# Patient Record
Sex: FEMALE | Race: BLACK OR AFRICAN AMERICAN | Employment: UNEMPLOYED | URBAN - METROPOLITAN AREA
[De-identification: names, ages, dates, MRNs, and addresses within clinical notes are randomized per-mention and may not be internally consistent; named-entity substitution may affect disease eponyms.]

---

## 2017-06-19 ENCOUNTER — HOSPITAL ENCOUNTER (OUTPATIENT)
Dept: ULTRASOUND IMAGING | Age: 57
Discharge: HOME | End: 2017-06-19
Attending: FAMILY MEDICINE
Payer: COMMERCIAL

## 2017-06-19 DIAGNOSIS — R79.89 ELEVATED LFTS: ICD-10-CM

## 2017-06-19 PROCEDURE — 76705 ECHO EXAM OF ABDOMEN: CPT

## 2017-10-10 ENCOUNTER — HOSPITAL ENCOUNTER (OUTPATIENT)
Dept: MAMMOGRAPHY | Age: 57
Discharge: HOME | End: 2017-10-10
Attending: OBSTETRICS & GYNECOLOGY
Payer: COMMERCIAL

## 2017-10-10 DIAGNOSIS — Z12.39 SCREENING BREAST EXAMINATION: ICD-10-CM

## 2017-10-10 PROCEDURE — 77067 SCR MAMMO BI INCL CAD: CPT

## 2017-10-16 ENCOUNTER — DOCUMENTATION ONLY (OUTPATIENT)
Dept: SURGERY | Age: 57
End: 2017-10-16

## 2017-12-04 ENCOUNTER — HOSPITAL ENCOUNTER (OUTPATIENT)
Dept: MAMMOGRAPHY | Age: 57
Discharge: HOME | End: 2017-12-04
Payer: COMMERCIAL

## 2017-12-04 ENCOUNTER — HOSPITAL ENCOUNTER (OUTPATIENT)
Dept: ULTRASOUND IMAGING | Age: 57
Discharge: HOME | End: 2017-12-04
Payer: COMMERCIAL

## 2017-12-04 DIAGNOSIS — N63.0 BREAST NODULE: ICD-10-CM

## 2017-12-04 PROCEDURE — 77061 BREAST TOMOSYNTHESIS UNI: CPT

## 2018-10-06 ENCOUNTER — HOSPITAL ENCOUNTER (OUTPATIENT)
Dept: GENERAL RADIOLOGY | Age: 58
Discharge: HOME | End: 2018-10-06
Payer: COMMERCIAL

## 2018-10-06 DIAGNOSIS — M25.562 LEFT KNEE PAIN: ICD-10-CM

## 2018-10-06 PROCEDURE — 73564 X-RAY EXAM KNEE 4 OR MORE: CPT

## 2018-10-23 ENCOUNTER — OFFICE VISIT (OUTPATIENT)
Dept: ORTHOPEDIC SURGERY | Age: 58
End: 2018-10-23

## 2018-10-23 VITALS
TEMPERATURE: 97.9 F | BODY MASS INDEX: 36.06 KG/M2 | OXYGEN SATURATION: 100 % | WEIGHT: 191 LBS | HEART RATE: 80 BPM | HEIGHT: 61 IN | RESPIRATION RATE: 16 BRPM | DIASTOLIC BLOOD PRESSURE: 80 MMHG | SYSTOLIC BLOOD PRESSURE: 121 MMHG

## 2018-10-23 DIAGNOSIS — S83.242A ACUTE MEDIAL MENISCAL TEAR, LEFT, INITIAL ENCOUNTER: Primary | ICD-10-CM

## 2018-10-23 PROBLEM — E66.01 SEVERE OBESITY (HCC): Status: ACTIVE | Noted: 2018-10-23

## 2018-10-23 RX ORDER — OMEPRAZOLE 20 MG/1
20 CAPSULE, DELAYED RELEASE ORAL DAILY
COMMUNITY

## 2018-10-23 RX ORDER — LISINOPRIL AND HYDROCHLOROTHIAZIDE 20; 25 MG/1; MG/1
TABLET ORAL
COMMUNITY

## 2018-10-23 RX ORDER — ATORVASTATIN CALCIUM 80 MG/1
80 TABLET, FILM COATED ORAL
COMMUNITY

## 2018-10-23 RX ORDER — METFORMIN HYDROCHLORIDE 500 MG/1
TABLET ORAL 2 TIMES DAILY WITH MEALS
COMMUNITY

## 2018-10-23 RX ORDER — MELOXICAM 15 MG/1
15 TABLET ORAL
Qty: 30 TAB | Refills: 1 | Status: SHIPPED | OUTPATIENT
Start: 2018-10-23 | End: 2018-12-11 | Stop reason: SDUPTHER

## 2018-10-23 NOTE — PROGRESS NOTES
Davin Gamez  1960   Chief Complaint   Patient presents with    Knee Pain     left knee pain        HISTORY OF PRESENT ILLNESS  Davin Gamez is a 63 y.o. female who presents today for evaluation of left knee pain. she rates her pain 3/10 today. Pain has been present since August 2018 when the patient was playing volleyball and felt a pop. The patient has been limping. Patient describes the pain as aching and sharp that is Intermittent in nature. Symptoms are worse with prolonged walking and standing, stairs, Activity and is better with  Rest, Elevation. Associated symptoms include Swelling. Since problem started, it: is unchanged. Pain does not wake patient up at night. Has taken no meds for the problem. Has tried following treatments: Injections:NO; Brace:NO; Therapy:NO; Cane/Crutch:NO       Allergies   Allergen Reactions    Codeine Nausea and Vomiting        History reviewed. No pertinent past medical history. Social History     Socioeconomic History    Marital status:      Spouse name: Not on file    Number of children: Not on file    Years of education: Not on file    Highest education level: Not on file   Social Needs    Financial resource strain: Not on file    Food insecurity - worry: Not on file    Food insecurity - inability: Not on file    Transportation needs - medical: Not on file   [place] needs - non-medical: Not on file   Occupational History    Not on file   Tobacco Use    Smoking status: Former Smoker    Smokeless tobacco: Never Used   Substance and Sexual Activity    Alcohol use: Not on file    Drug use: Not on file    Sexual activity: Not on file   Other Topics Concern    Not on file   Social History Narrative    Not on file      History reviewed. No pertinent surgical history.    Family History   Problem Relation Age of Onset    Breast Cancer Sister 46      Current Outpatient Medications   Medication Sig    omeprazole (PRILOSEC) 20 mg capsule Take 20 mg by mouth daily.  lisinopril-hydroCHLOROthiazide (PRINZIDE, ZESTORETIC) 20-25 mg per tablet Take  by mouth daily.  atorvastatin (LIPITOR) 80 mg tablet Take 80 mg by mouth daily.  metFORMIN (GLUCOPHAGE) 500 mg tablet Take  by mouth two (2) times daily (with meals). No current facility-administered medications for this visit. REVIEW OF SYSTEM   Patient denies: Weight loss, Fever/Chills, HA, Visual changes, Fatigue, Chest pain, SOB, Abdominal pain, N/V/D/C, Blood in stool or urine, Edema. Pertinent positive as above in HPI. All others were negative    PHYSICAL EXAM:   Visit Vitals  /80   Pulse 80   Temp 97.9 °F (36.6 °C) (Oral)   Resp 16   Ht 5' 1\" (1.549 m)   Wt 191 lb (86.6 kg)   SpO2 100%   BMI 36.09 kg/m²     The patient is a well-developed, well-nourished female   in no acute distress. The patient is alert and oriented times three. The patient is alert and oriented times three. Mood and affect are normal.  LYMPHATIC: lymph nodes are not enlarged and are within normal limits  SKIN: normal in color and non tender to palpation. There are no bruises or abrasions noted. NEUROLOGICAL: Motor sensory exam is within normal limits. Reflexes are equal bilaterally. There is normal sensation to pinprick and light touch  MUSCULOSKELETAL:  Examination Left knee   Skin Intact   Range of motion 0-130   Effusion +   Medial joint line tenderness +   Lateral joint line tenderness +   Tenderness Pes Bursa -   Tenderness insertion MCL -   Tenderness insertion LCL -   Carmellas -   Patella crepitus -   Patella grind -   Lachman -   Pivot shift -   Anterior drawer -   Posterior drawer -   Varus stress -   Valgus stress -   Neurovascular Intact   Calf Swelling and Tenderness to Palpation -   Diane's Test -   Hamstring Cord Tightness -     IMAGING: XR of left knee dated 10/6/18 was reviewed and read: Impression:   No evidence of acute fracture or dislocation.    Mild tricompartmental degenerative changes, best seen medially. IMPRESSION:      ICD-10-CM ICD-9-CM    1. Acute medial meniscal tear, left, initial encounter S83.242A 836.0 MRI KNEE LT WO CONT      meloxicam (MOBIC) 15 mg tablet        PLAN:  1. The patient presents today with left knee due to a possible medial meniscal tear and I would like the patient to get an MRI. Risk factors include: n/a  2. No ultrasound exam indicated today  3. No cortisone injection indicated today   4. No Physical/Occupational Therapy indicated today  5. Yes diagnostic test indicated today: MRI L KNEE  6. No durable medical equipment indicated today  7. No referral indicated today   8. Yes medications indicated today: MOBIC  9. No Narcotic indicated today       RTC following MRI  Follow-up Disposition: Not on File    Scribed by Moses Sherwood [address]) as dictated by Kan Mercedes MD    I, Dr. Kan Mercedes, confirm that all documentation is accurate.     Kan Mercedes M.D.   [facility]

## 2018-10-31 ENCOUNTER — HOSPITAL ENCOUNTER (OUTPATIENT)
Dept: MRI IMAGING | Age: 58
Discharge: HOME | End: 2018-10-31
Attending: ORTHOPAEDIC SURGERY
Payer: COMMERCIAL

## 2018-10-31 DIAGNOSIS — S83.242A ACUTE MEDIAL MENISCAL TEAR, LEFT, INITIAL ENCOUNTER: ICD-10-CM

## 2018-10-31 PROCEDURE — 73721 MRI JNT OF LWR EXTRE W/O DYE: CPT

## 2018-11-06 ENCOUNTER — OFFICE VISIT (OUTPATIENT)
Dept: ORTHOPEDIC SURGERY | Age: 58
End: 2018-11-06

## 2018-11-06 VITALS
WEIGHT: 194 LBS | OXYGEN SATURATION: 99 % | BODY MASS INDEX: 36.63 KG/M2 | DIASTOLIC BLOOD PRESSURE: 81 MMHG | HEIGHT: 61 IN | TEMPERATURE: 98.1 F | HEART RATE: 77 BPM | RESPIRATION RATE: 16 BRPM | SYSTOLIC BLOOD PRESSURE: 142 MMHG

## 2018-11-06 DIAGNOSIS — S83.242D ACUTE MEDIAL MENISCAL TEAR, LEFT, SUBSEQUENT ENCOUNTER: Primary | ICD-10-CM

## 2018-11-06 DIAGNOSIS — M17.12 PRIMARY OSTEOARTHRITIS OF LEFT KNEE: ICD-10-CM

## 2018-11-06 NOTE — PROGRESS NOTES
Ana Lilia Sosa  1960   Chief Complaint   Patient presents with    Knee Pain     left knee MRI results        HISTORY OF PRESENT ILLNESS  Ana Lilia Sosa is a 63 y.o. female who presents today for reevaluation of left knee pain and to review MRI. Patient rates pain as 2/10 today. Pain has been present since August 2018 when the patient was playing volleyball and felt a pop. The patient has been limping. Pain with prolonged walking and standing, stairs. Patient denies any fever, chills, chest pain, shortness of breath or calf pain. The remainder of the review of systems is negative. There are no new illness or injuries to report since last seen in the office. There are no changes to medications, allergies, family or social history. PHYSICAL EXAM:   Visit Vitals  /81   Pulse 77   Temp 98.1 °F (36.7 °C) (Oral)   Resp 16   Ht 5' 1\" (1.549 m)   Wt 194 lb (88 kg)   SpO2 99%   BMI 36.66 kg/m²     The patient is a well-developed, well-nourished female   in no acute distress. The patient is alert and oriented times three. The patient is alert and oriented times three. Mood and affect are normal.  LYMPHATIC: lymph nodes are not enlarged and are within normal limits  SKIN: normal in color and non tender to palpation. There are no bruises or abrasions noted. NEUROLOGICAL: Motor sensory exam is within normal limits. Reflexes are equal bilaterally.  There is normal sensation to pinprick and light touch  MUSCULOSKELETAL:  Examination Left knee   Skin Intact   Range of motion 0-130   Effusion +   Medial joint line tenderness +   Lateral joint line tenderness +   Tenderness Pes Bursa -   Tenderness insertion MCL -   Tenderness insertion LCL -   Carmellas -   Patella crepitus -   Patella grind -   Lachman -   Pivot shift -   Anterior drawer -   Posterior drawer -   Varus stress -   Valgus stress -   Neurovascular Intact   Calf Swelling and Tenderness to Palpation -   Diane's Test -   Hamstring Cord Tightness - IMAGING: MRI of left knee dated 10/31/18 was reviewed and read: IMPRESSION:  Tricompartment osteoarthritis with degenerative macerated tearing of the posterior horn medial meniscus as discussed. Moderate knee effusion. Moderate Baker's cyst with chronic calcification. Anterior cruciate ligament poorly visualized. Tear not excluded. Correlate with anterior drawer test.     XR of left knee dated 10/6/18 was reviewed and read: Impression:   No evidence of acute fracture or dislocation. Mild tricompartmental degenerative changes, best seen medially.      IMPRESSION:      ICD-10-CM ICD-9-CM    1. Acute medial meniscal tear, left, subsequent encounter S83.242D V58.89      836.0    2. Primary osteoarthritis of left knee M17.12 715.16         PLAN:   1. I discussed the risks and benefits and potential adverse outcomes of both operative vs non operative treatment of left medial meniscus tear with the patient. Patient wishes to proceed with arthroscopic left medial meniscectomy. Risks of operative intervention include but not limited to bleeding, infection, deep vein thrombosis, pulmonary embolism, death, limb length discrepancy, reflexive sympathetic dystrophy, fat embolism syndrome,damage to blood vessels and nerves, malunion, non-union, delayed union, failure of hardware, post traumatic arthritis, stroke, heart attack, and death. Patient understands that infection may arise and may require numerous surgeries. History and physical exam scheduled for a later date. Risk factors include: n/a  2. No ultrasound exam indicated today  3. No cortisone injection indicated today   4. No Physical/Occupational Therapy indicated today  5. No diagnostic test indicated today:   6. No durable medical equipment indicated today  7. No referral indicated today   8. No medications indicated today:   9.  No Narcotic indicated today       RTC H&P  Follow-up Disposition: Not on File    Scribed by Moses Sherwood ([facility]) as dictated by MD QUINCY Joe, Dr. Afshin Davis, confirm that all documentation is accurate.     Afshin Davis M.D.   [facility]

## 2018-11-29 DIAGNOSIS — Z01.818 PREOP EXAMINATION: Primary | ICD-10-CM

## 2018-12-03 ENCOUNTER — HOSPITAL ENCOUNTER (OUTPATIENT)
Dept: LAB | Age: 58
Discharge: HOME | End: 2018-12-03
Payer: COMMERCIAL

## 2018-12-03 DIAGNOSIS — Z01.818 PREOP EXAMINATION: ICD-10-CM

## 2018-12-03 LAB
ANION GAP SERPL CALC-SCNC: 5 MMOL/L (ref 3–18)
ATRIAL RATE: 74 BPM
BASOPHILS # BLD: 0 K/UL (ref 0–0.1)
BASOPHILS NFR BLD: 0 % (ref 0–2)
BUN SERPL-MCNC: 12 MG/DL (ref 7–18)
BUN/CREAT SERPL: 15 (ref 12–20)
CALCIUM SERPL-MCNC: 9.7 MG/DL (ref 8.5–10.1)
CALCULATED P AXIS, ECG09: 31 DEGREES
CALCULATED R AXIS, ECG10: 47 DEGREES
CALCULATED T AXIS, ECG11: 19 DEGREES
CHLORIDE SERPL-SCNC: 106 MMOL/L (ref 100–108)
CO2 SERPL-SCNC: 30 MMOL/L (ref 21–32)
CREAT SERPL-MCNC: 0.82 MG/DL (ref 0.6–1.3)
DIAGNOSIS, 93000: NORMAL
DIFFERENTIAL METHOD BLD: NORMAL
EOSINOPHIL # BLD: 0.2 K/UL (ref 0–0.4)
EOSINOPHIL NFR BLD: 3 % (ref 0–5)
ERYTHROCYTE [DISTWIDTH] IN BLOOD BY AUTOMATED COUNT: 13.1 % (ref 11.6–14.5)
GLUCOSE SERPL-MCNC: 89 MG/DL (ref 74–99)
HCT VFR BLD AUTO: 38.9 % (ref 35–45)
HGB BLD-MCNC: 13.3 G/DL (ref 12–16)
LYMPHOCYTES # BLD: 2.1 K/UL (ref 0.9–3.6)
LYMPHOCYTES NFR BLD: 42 % (ref 21–52)
MCH RBC QN AUTO: 29.8 PG (ref 24–34)
MCHC RBC AUTO-ENTMCNC: 34.2 G/DL (ref 31–37)
MCV RBC AUTO: 87.2 FL (ref 74–97)
MONOCYTES # BLD: 0.4 K/UL (ref 0.05–1.2)
MONOCYTES NFR BLD: 8 % (ref 3–10)
NEUTS SEG # BLD: 2.3 K/UL (ref 1.8–8)
NEUTS SEG NFR BLD: 47 % (ref 40–73)
P-R INTERVAL, ECG05: 166 MS
PLATELET # BLD AUTO: 263 K/UL (ref 135–420)
PMV BLD AUTO: 10.5 FL (ref 9.2–11.8)
POTASSIUM SERPL-SCNC: 4.5 MMOL/L (ref 3.5–5.5)
Q-T INTERVAL, ECG07: 382 MS
QRS DURATION, ECG06: 60 MS
QTC CALCULATION (BEZET), ECG08: 424 MS
RBC # BLD AUTO: 4.46 M/UL (ref 4.2–5.3)
SODIUM SERPL-SCNC: 141 MMOL/L (ref 136–145)
VENTRICULAR RATE, ECG03: 74 BPM
WBC # BLD AUTO: 4.9 K/UL (ref 4.6–13.2)

## 2018-12-03 PROCEDURE — 80048 BASIC METABOLIC PNL TOTAL CA: CPT

## 2018-12-03 PROCEDURE — 85025 COMPLETE CBC W/AUTO DIFF WBC: CPT

## 2018-12-03 PROCEDURE — 36415 COLL VENOUS BLD VENIPUNCTURE: CPT

## 2018-12-03 PROCEDURE — 93005 ELECTROCARDIOGRAM TRACING: CPT

## 2018-12-11 ENCOUNTER — OFFICE VISIT (OUTPATIENT)
Dept: ORTHOPEDIC SURGERY | Age: 58
End: 2018-12-11

## 2018-12-11 VITALS
DIASTOLIC BLOOD PRESSURE: 77 MMHG | HEART RATE: 82 BPM | TEMPERATURE: 97.5 F | WEIGHT: 192 LBS | SYSTOLIC BLOOD PRESSURE: 132 MMHG | OXYGEN SATURATION: 100 % | HEIGHT: 61 IN | BODY MASS INDEX: 36.25 KG/M2

## 2018-12-11 DIAGNOSIS — S83.242A ACUTE MEDIAL MENISCAL TEAR, LEFT, INITIAL ENCOUNTER: ICD-10-CM

## 2018-12-11 DIAGNOSIS — M17.12 PRIMARY OSTEOARTHRITIS OF LEFT KNEE: ICD-10-CM

## 2018-12-11 DIAGNOSIS — S83.242D ACUTE MEDIAL MENISCAL TEAR, LEFT, SUBSEQUENT ENCOUNTER: Primary | ICD-10-CM

## 2018-12-11 RX ORDER — HYDROCODONE BITARTRATE AND ACETAMINOPHEN 10; 325 MG/1; MG/1
1 TABLET ORAL
Qty: 40 TAB | Refills: 0 | Status: SHIPPED | OUTPATIENT
Start: 2018-12-11

## 2018-12-11 RX ORDER — ACETAMINOPHEN 325 MG/1
1000 TABLET ORAL ONCE
Status: CANCELLED | OUTPATIENT
Start: 2018-12-11 | End: 2018-12-11

## 2018-12-11 NOTE — H&P
HISTORY AND PHYSICAL          Patient: Ana Lilia Sosa                MRN: [id number]       SSN: [id number]  YOB: 1960          AGE: 63 y.o. SEX: female      Patient scheduled for:  Left knee arthroscopic partial medial menisectomy    Surgeon: Ruth Rodriguez MD    ANESTHESIA TYPE:  General    HISTORY:     The patient was seen in the office today for a preoperative history and physical for an upcoming above listed surgery. The patient is a pleasant 63 y.o. female who has a history of left knee pain. Pain has been present since August 2018 when the patient was playing volleyball and felt a pop. The patient has been limping. Pain with prolonged walking and standing, stairs. Pain level is a 2/10. Due to the current findings, affected activity of daily living and continued pain and discomfort, surgical intervention is indicated. The alternatives, risks, and complications, including but not limited to infection, blood loss, need for blood transfusion, neurovascular damage, jensen-incisional numbness, subcutaneous hematoma, bone fracture, anesthetic complications, DVT, PE, death, RSD, postoperative stiffness and pain, possible surgical scar, delayed healing and nonhealing, reflexive sympathetic dystrophy, damage to blood vessels and nerves, need for more surgery, MI, and stroke,  failure of hardware, gait disturbances,have been discussed. The patient understands and wishes to proceed with surgery. PAST MEDICAL HISTORY:     Past Medical History:   Diagnosis Date    Elevated cholesterol     GERD (gastroesophageal reflux disease)     Hypertension     Pre-diabetes        CURRENT MEDICATIONS:     Current Outpatient Medications   Medication Sig Dispense Refill    HYDROcodone-acetaminophen (NORCO)  mg tablet Take 1 Tab by mouth every four (4) hours as needed for Pain. Max Daily Amount: 6 Tabs.  Do not take until after surgery (for acute post operative pain) 40 Tab 0    omeprazole (PRILOSEC) 20 mg capsule Take 20 mg by mouth daily.  lisinopril-hydroCHLOROthiazide (PRINZIDE, ZESTORETIC) 20-25 mg per tablet Take  by mouth nightly.  atorvastatin (LIPITOR) 80 mg tablet Take 80 mg by mouth nightly.  metFORMIN (GLUCOPHAGE) 500 mg tablet Take  by mouth two (2) times daily (with meals).  meloxicam (MOBIC) 15 mg tablet Take 1 Tab by mouth daily (with breakfast). 30 Tab 1       ALLERGIES:     Allergies   Allergen Reactions    Codeine Nausea and Vomiting         SURGICAL HISTORY:     Past Surgical History:   Procedure Laterality Date    HX CYST REMOVAL      bartholin cyst x 2    HX CYST REMOVAL Left     x2    HX PARTIAL HYSTERECTOMY      HX TONSILLECTOMY         SOCIAL HISTORY:     Social History     Socioeconomic History    Marital status: UNKNOWN     Spouse name: Not on file    Number of children: Not on file    Years of education: Not on file    Highest education level: Not on file   Tobacco Use    Smoking status: Former Smoker    Smokeless tobacco: Never Used   Substance and Sexual Activity    Alcohol use: Yes     Alcohol/week: 0.6 oz     Types: 1 Glasses of wine per week     Frequency: Never     Comment: occasional    Drug use: No       FAMILY HISTORY:     Family History   Problem Relation Age of Onset    Breast Cancer Sister 46       REVIEW OF SYSTEMS:     Negative for fevers, chills, chest pain, shortness of breath, weight loss, recent illness     General: Negative for fever and chills. No unexpected change in weight. Denies fatigue. No change in appetite. Skin: Negative for rash or itching. HEENT: Negative for congestion, sore throat, neck pain and neck stiffness. No change in vision or hearing. Hasn't noted any enlarged lymph nodes in the neck. Cardiovascular:  Negative for chest pain and palpitations. Has not noted pedal edema. Respiratory: Negative for cough, colds, sinus, hemoptysis, shortness of breath and wheezing.   Gastrointestinal: Negative for nausea and vomiting, rectal bleeding, coffee ground emesis, abdominal pain, diarrhea and constipation. Genitourinary: Negative for dysuria, frequency urgency, or burning on micturition. No flank pain, no foul smelling urine, no difficulty with initiating urination. Hematological: Negative for bleeding or easy bruising. Musculoskeletal: Negative  for arthralgias, back pain or neck pain. Neurological: Negative for dizziness, seizures or syncopal episodes. Denies headaches. Endocrine: Denies excessive thirst.  No heat/cold intolerance. Psychiatric: Negative for depression or insomnia. PHYSICAL EXAMINATION:     VITALS: There were no vitals taken for this visit. GEN:  Well developed, well nourished 63 y.o. female in no acute distress. HEENT: Normocephalic and atraumatic. Eyes: Conjunctivae and EOM are normal.Pupils are equal, round, and reactive to light. External ear normal appearance, external nose normal appearing. Mouth/Throat: Oropharynx is clear and moist, able to handle oral secretions w/out difficulty, airway patent  NECK: Supple. Normal ROM, No lymphadenopathy. Trachea is midline. No bruising, swelling or deformity  RESP: Clear to auscultation bilaterally. No wheezes, rales, rhonchi. Normal effort and breath sounds. No respiratory distress  CARDIO:  Normal rate, regular rhythm and normal heart sounds. No MGR. ABDOMEN: Soft, non-tender, non-distended, normoactive bowel sounds in all four quadrants. There is no tenderness. There is no rebound and no guarding.    BACK: No CVA or spinal tenderness  BREAST:  Deferred  PELVIC:    Deferred   RECTAL:  Deferred   :           Deferred  EXTREMITIES: EXAMINATION OF: left knee  Examination Left knee   Skin Intact   Range of motion 0-120   Effusion +   Medial joint line tenderness +   Lateral joint line tenderness -   Tenderness Pes Bursa -   Tenderness insertion MCL -   Tenderness insertion LCL -   Carmellas -   Patella crepitus -   Patella grind -   Lachman -   Pivot shift -   Anterior drawer -   Posterior drawer -   Varus stress -   Valgus stress -   Neurovascular Intact   Calf Swelling and Tenderness to Palpation -   Diane's Test -   Hamstring Cord Tightness -       NEUROVASCULAR: Sensation intact to light touch and strength grossly intact and symmetrical. No nystagmus. Positive distal pulses and capillary refill. DVT ASSESSMENT:  There is not  calf tenderness. No evidence of DVT seen on physical exam.  MOTOR: In tact  PSYCH: Alert an oriented to person, place and time. Mood, memory, affect, behavior and judgment normal       RADIOGRAPHS & DIAGNOSTIC STUDIES:     MRI/xray reveals :  MRI of left knee dated 10/31/18 was reviewed and read: IMPRESSION:  Tricompartment osteoarthritis with degenerative macerated tearing of the posterior horn medial meniscus as discussed. Moderate knee effusion. Moderate Baker's cyst with chronic calcification. Anterior cruciate ligament poorly visualized. Tear not excluded. Correlate with anterior drawer test.         LABS:       @  CBC:   Lab Results   Component Value Date/Time    WBC 4.9 12/03/2018 01:17 PM    RBC 4.46 12/03/2018 01:17 PM    HGB 13.3 12/03/2018 01:17 PM    HCT 38.9 12/03/2018 01:17 PM    PLATELET 636 34/36/9207 01:17 PM    and BMP:   Lab Results   Component Value Date/Time    Glucose 89 12/03/2018 01:17 PM    Sodium 141 12/03/2018 01:17 PM    Potassium 4.5 12/03/2018 01:17 PM    Chloride 106 12/03/2018 01:17 PM    CO2 30 12/03/2018 01:17 PM    BUN 12 12/03/2018 01:17 PM    Creatinine 0.82 12/03/2018 01:17 PM    Calcium 9.7 12/03/2018 01:17 PM   @    Preoperative labs were reviewed and are substantially within normal limits   EKG: Sinus rhythm with marked sinus arrhythmia   Otherwise normal ECG   No previous ECGs available   Confirmed by Reza Sun MD, --- ([id number]) on 12/3/2018 2:57:46 PM       ASSESSMENT:       Encounter Diagnoses   Name Primary?     Acute medial meniscal tear, left, subsequent encounter Yes    Primary osteoarthritis of left knee        PLAN:     Again, the alternatives, risks, and complications, as well as expected outcome were discussed. The patient understands and agrees to proceed with left knee arthroscopic partial medial menisectomy.  Patient given orders listed below:    Orders Placed This Encounter    HYDROcodone-acetaminophen (NORCO)  mg tablet         Domonique Conteh PA-C  12/11/2018  10:39 AM

## 2018-12-11 NOTE — PATIENT INSTRUCTIONS
Dr. Cesario Yeager Knee Arthroscopy Surgery    What is the surgery? - This is an outpatient procedure at either [place] [address] or [address] [place], [place] [address]dodie Burgess will be completely asleep for procedure. Dr. Cesario Yeager will make 2 small incisions in your knee. He will take a tour of your knee with the camera and then address the meniscal tear(s). We will be able to evaluate if any arthritis in your knee but this surgery is not to treat your arthritis. - Total surgery takes about 25-30 mins     What can you expect after surgery? - You will have a bulky dressing on your knee that you can remove 2 days after surgery. You will be able to shower 2 days after surgery but no soaking in a bath, hot tub, ocean or pool x 2 weeks to allow for full wound healing. No special brace is needed. - You will be on crutches or a walker when you leave the hospital. You can place weight on your leg as tolerated starting immediately. You are usually on crutches or your walker for about 4-5 days.   - Even though you can place weight on your leg, we recommend no walking or standing longer than 10mins for the first week. We will gradually increase your activities after that point.    - Dr. Cesario Yeager will start physical therapy for you when you return for your 1 week post op apt  - It will take your 6-8 weeks to fully recover from your surgery. When can I return to work? - Most patients return to desk work only after 1-2 weeks. We recommend no prolonged walking or standing, climbing, kneeling, or crawling x 6-8 weeks. Not all knee arthroscopies are the same. The specifics of your individual case will be discussed at length with you by Dr. Cesario Yeager and his Physician Assistant.

## 2018-12-12 RX ORDER — MELOXICAM 15 MG/1
TABLET ORAL
Qty: 30 TAB | Refills: 1 | Status: SHIPPED | OUTPATIENT
Start: 2018-12-12

## 2018-12-13 ENCOUNTER — ANESTHESIA EVENT (OUTPATIENT)
Dept: SURGERY | Age: 58
End: 2018-12-13
Payer: COMMERCIAL

## 2018-12-13 RX ORDER — SODIUM CHLORIDE 0.9 % (FLUSH) 0.9 %
5-10 SYRINGE (ML) INJECTION EVERY 8 HOURS
Status: CANCELLED | OUTPATIENT
Start: 2018-12-13

## 2018-12-13 RX ORDER — SODIUM CHLORIDE 0.9 % (FLUSH) 0.9 %
5-10 SYRINGE (ML) INJECTION AS NEEDED
Status: CANCELLED | OUTPATIENT
Start: 2018-12-13

## 2018-12-14 ENCOUNTER — HOSPITAL ENCOUNTER (OUTPATIENT)
Age: 58
Setting detail: OUTPATIENT SURGERY
Discharge: HOME | End: 2018-12-14
Attending: ORTHOPAEDIC SURGERY | Admitting: ORTHOPAEDIC SURGERY
Payer: COMMERCIAL

## 2018-12-14 ENCOUNTER — ANESTHESIA (OUTPATIENT)
Dept: SURGERY | Age: 58
End: 2018-12-14
Payer: COMMERCIAL

## 2018-12-14 VITALS
BODY MASS INDEX: 35.87 KG/M2 | HEIGHT: 61 IN | WEIGHT: 190 LBS | OXYGEN SATURATION: 93 % | RESPIRATION RATE: 15 BRPM | HEART RATE: 73 BPM | DIASTOLIC BLOOD PRESSURE: 70 MMHG | TEMPERATURE: 96.6 F | SYSTOLIC BLOOD PRESSURE: 105 MMHG

## 2018-12-14 LAB
GLUCOSE BLD STRIP.AUTO-MCNC: 112 MG/DL (ref 70–110)
GLUCOSE BLD STRIP.AUTO-MCNC: 114 MG/DL (ref 70–110)

## 2018-12-14 PROCEDURE — 76210000006 HC OR PH I REC 0.5 TO 1 HR: Performed by: ORTHOPAEDIC SURGERY

## 2018-12-14 PROCEDURE — 74011250636 HC RX REV CODE- 250/636: Performed by: PHYSICIAN ASSISTANT

## 2018-12-14 PROCEDURE — 74011250636 HC RX REV CODE- 250/636

## 2018-12-14 PROCEDURE — 82962 GLUCOSE BLOOD TEST: CPT

## 2018-12-14 PROCEDURE — 76060000032 HC ANESTHESIA 0.5 TO 1 HR: Performed by: ORTHOPAEDIC SURGERY

## 2018-12-14 PROCEDURE — 76010000138 HC OR TIME 0.5 TO 1 HR: Performed by: ORTHOPAEDIC SURGERY

## 2018-12-14 PROCEDURE — 77030020782 HC GWN BAIR PAWS FLX 3M -B: Performed by: ORTHOPAEDIC SURGERY

## 2018-12-14 PROCEDURE — 77030008574 HC TBNG SUC IRR STRY -B: Performed by: ORTHOPAEDIC SURGERY

## 2018-12-14 PROCEDURE — 74011250636 HC RX REV CODE- 250/636: Performed by: NURSE ANESTHETIST, CERTIFIED REGISTERED

## 2018-12-14 PROCEDURE — 77030013079 HC BLNKT BAIR HGGR 3M -A: Performed by: ANESTHESIOLOGY

## 2018-12-14 PROCEDURE — 74011000250 HC RX REV CODE- 250: Performed by: ORTHOPAEDIC SURGERY

## 2018-12-14 PROCEDURE — 77030018836 HC SOL IRR NACL ICUM -A: Performed by: ORTHOPAEDIC SURGERY

## 2018-12-14 PROCEDURE — 77030032490 HC SLV COMPR SCD KNE COVD -B: Performed by: ORTHOPAEDIC SURGERY

## 2018-12-14 PROCEDURE — 74011250637 HC RX REV CODE- 250/637: Performed by: NURSE ANESTHETIST, CERTIFIED REGISTERED

## 2018-12-14 PROCEDURE — 74011000250 HC RX REV CODE- 250

## 2018-12-14 PROCEDURE — 74011250637 HC RX REV CODE- 250/637: Performed by: PHYSICIAN ASSISTANT

## 2018-12-14 PROCEDURE — 77030039266 HC ADH SKN EXOFIN S2SG -A: Performed by: ORTHOPAEDIC SURGERY

## 2018-12-14 PROCEDURE — 77030008683 HC TU ET CUF COVD -A: Performed by: ANESTHESIOLOGY

## 2018-12-14 PROCEDURE — 77030002933 HC SUT MCRYL J&J -A: Performed by: ORTHOPAEDIC SURGERY

## 2018-12-14 PROCEDURE — 76210000026 HC REC RM PH II 1 TO 1.5 HR: Performed by: ORTHOPAEDIC SURGERY

## 2018-12-14 RX ORDER — FENTANYL CITRATE 50 UG/ML
INJECTION, SOLUTION INTRAMUSCULAR; INTRAVENOUS AS NEEDED
Status: DISCONTINUED | OUTPATIENT
Start: 2018-12-14 | End: 2018-12-14 | Stop reason: HOSPADM

## 2018-12-14 RX ORDER — ONDANSETRON 2 MG/ML
INJECTION INTRAMUSCULAR; INTRAVENOUS AS NEEDED
Status: DISCONTINUED | OUTPATIENT
Start: 2018-12-14 | End: 2018-12-14 | Stop reason: HOSPADM

## 2018-12-14 RX ORDER — MIDAZOLAM HYDROCHLORIDE 1 MG/ML
INJECTION, SOLUTION INTRAMUSCULAR; INTRAVENOUS AS NEEDED
Status: DISCONTINUED | OUTPATIENT
Start: 2018-12-14 | End: 2018-12-14 | Stop reason: HOSPADM

## 2018-12-14 RX ORDER — SODIUM CHLORIDE, SODIUM LACTATE, POTASSIUM CHLORIDE, CALCIUM CHLORIDE 600; 310; 30; 20 MG/100ML; MG/100ML; MG/100ML; MG/100ML
50 INJECTION, SOLUTION INTRAVENOUS CONTINUOUS
Status: DISCONTINUED | OUTPATIENT
Start: 2018-12-14 | End: 2018-12-14 | Stop reason: HOSPADM

## 2018-12-14 RX ORDER — ALBUTEROL SULFATE 0.83 MG/ML
2.5 SOLUTION RESPIRATORY (INHALATION) AS NEEDED
Status: DISCONTINUED | OUTPATIENT
Start: 2018-12-14 | End: 2018-12-14 | Stop reason: HOSPADM

## 2018-12-14 RX ORDER — GLYCOPYRROLATE 0.2 MG/ML
INJECTION INTRAMUSCULAR; INTRAVENOUS AS NEEDED
Status: DISCONTINUED | OUTPATIENT
Start: 2018-12-14 | End: 2018-12-14 | Stop reason: HOSPADM

## 2018-12-14 RX ORDER — BUPIVACAINE HYDROCHLORIDE AND EPINEPHRINE 5; 5 MG/ML; UG/ML
INJECTION, SOLUTION EPIDURAL; INTRACAUDAL; PERINEURAL AS NEEDED
Status: DISCONTINUED | OUTPATIENT
Start: 2018-12-14 | End: 2018-12-14 | Stop reason: HOSPADM

## 2018-12-14 RX ORDER — INSULIN LISPRO 100 [IU]/ML
INJECTION, SOLUTION INTRAVENOUS; SUBCUTANEOUS ONCE
Status: DISCONTINUED | OUTPATIENT
Start: 2018-12-14 | End: 2018-12-14 | Stop reason: HOSPADM

## 2018-12-14 RX ORDER — LIDOCAINE HYDROCHLORIDE 10 MG/ML
0.1 INJECTION, SOLUTION EPIDURAL; INFILTRATION; INTRACAUDAL; PERINEURAL AS NEEDED
Status: DISCONTINUED | OUTPATIENT
Start: 2018-12-14 | End: 2018-12-14 | Stop reason: HOSPADM

## 2018-12-14 RX ORDER — ROCURONIUM BROMIDE 10 MG/ML
INJECTION, SOLUTION INTRAVENOUS AS NEEDED
Status: DISCONTINUED | OUTPATIENT
Start: 2018-12-14 | End: 2018-12-14 | Stop reason: HOSPADM

## 2018-12-14 RX ORDER — MAGNESIUM SULFATE 100 %
4 CRYSTALS MISCELLANEOUS AS NEEDED
Status: DISCONTINUED | OUTPATIENT
Start: 2018-12-14 | End: 2018-12-14 | Stop reason: SDUPTHER

## 2018-12-14 RX ORDER — PHENYLEPHRINE HCL IN 0.9% NACL 1 MG/10 ML
SYRINGE (ML) INTRAVENOUS AS NEEDED
Status: DISCONTINUED | OUTPATIENT
Start: 2018-12-14 | End: 2018-12-14 | Stop reason: HOSPADM

## 2018-12-14 RX ORDER — MAGNESIUM SULFATE 100 %
4 CRYSTALS MISCELLANEOUS AS NEEDED
Status: DISCONTINUED | OUTPATIENT
Start: 2018-12-14 | End: 2018-12-14 | Stop reason: HOSPADM

## 2018-12-14 RX ORDER — DIPHENHYDRAMINE HYDROCHLORIDE 50 MG/ML
12.5 INJECTION, SOLUTION INTRAMUSCULAR; INTRAVENOUS
Status: DISCONTINUED | OUTPATIENT
Start: 2018-12-14 | End: 2018-12-14 | Stop reason: HOSPADM

## 2018-12-14 RX ORDER — PROPOFOL 10 MG/ML
INJECTION, EMULSION INTRAVENOUS AS NEEDED
Status: DISCONTINUED | OUTPATIENT
Start: 2018-12-14 | End: 2018-12-14 | Stop reason: HOSPADM

## 2018-12-14 RX ORDER — FAMOTIDINE 20 MG/1
20 TABLET, FILM COATED ORAL ONCE
Status: COMPLETED | OUTPATIENT
Start: 2018-12-14 | End: 2018-12-14

## 2018-12-14 RX ORDER — DEXTROSE 50 % IN WATER (D50W) INTRAVENOUS SYRINGE
25-50 AS NEEDED
Status: DISCONTINUED | OUTPATIENT
Start: 2018-12-14 | End: 2018-12-14 | Stop reason: SDUPTHER

## 2018-12-14 RX ORDER — SUCCINYLCHOLINE CHLORIDE 20 MG/ML
INJECTION INTRAMUSCULAR; INTRAVENOUS AS NEEDED
Status: DISCONTINUED | OUTPATIENT
Start: 2018-12-14 | End: 2018-12-14 | Stop reason: HOSPADM

## 2018-12-14 RX ORDER — HYDROMORPHONE HYDROCHLORIDE 2 MG/ML
0.5 INJECTION, SOLUTION INTRAMUSCULAR; INTRAVENOUS; SUBCUTANEOUS
Status: DISCONTINUED | OUTPATIENT
Start: 2018-12-14 | End: 2018-12-14 | Stop reason: HOSPADM

## 2018-12-14 RX ORDER — CEFAZOLIN SODIUM 2 G/50ML
2 SOLUTION INTRAVENOUS ONCE
Status: COMPLETED | OUTPATIENT
Start: 2018-12-14 | End: 2018-12-14

## 2018-12-14 RX ORDER — SODIUM CHLORIDE, SODIUM LACTATE, POTASSIUM CHLORIDE, CALCIUM CHLORIDE 600; 310; 30; 20 MG/100ML; MG/100ML; MG/100ML; MG/100ML
75 INJECTION, SOLUTION INTRAVENOUS CONTINUOUS
Status: DISCONTINUED | OUTPATIENT
Start: 2018-12-14 | End: 2018-12-14 | Stop reason: HOSPADM

## 2018-12-14 RX ORDER — ONDANSETRON 2 MG/ML
4 INJECTION INTRAMUSCULAR; INTRAVENOUS ONCE
Status: COMPLETED | OUTPATIENT
Start: 2018-12-14 | End: 2018-12-14

## 2018-12-14 RX ORDER — DEXTROSE 50 % IN WATER (D50W) INTRAVENOUS SYRINGE
25-50 AS NEEDED
Status: DISCONTINUED | OUTPATIENT
Start: 2018-12-14 | End: 2018-12-14 | Stop reason: HOSPADM

## 2018-12-14 RX ORDER — METOPROLOL TARTRATE 5 MG/5ML
INJECTION INTRAVENOUS AS NEEDED
Status: DISCONTINUED | OUTPATIENT
Start: 2018-12-14 | End: 2018-12-14 | Stop reason: HOSPADM

## 2018-12-14 RX ORDER — LIDOCAINE HYDROCHLORIDE 20 MG/ML
INJECTION, SOLUTION EPIDURAL; INFILTRATION; INTRACAUDAL; PERINEURAL AS NEEDED
Status: DISCONTINUED | OUTPATIENT
Start: 2018-12-14 | End: 2018-12-14 | Stop reason: HOSPADM

## 2018-12-14 RX ORDER — KETOROLAC TROMETHAMINE 30 MG/ML
INJECTION, SOLUTION INTRAMUSCULAR; INTRAVENOUS AS NEEDED
Status: DISCONTINUED | OUTPATIENT
Start: 2018-12-14 | End: 2018-12-14 | Stop reason: HOSPADM

## 2018-12-14 RX ORDER — ACETAMINOPHEN 500 MG
1000 TABLET ORAL ONCE
Status: COMPLETED | OUTPATIENT
Start: 2018-12-14 | End: 2018-12-14

## 2018-12-14 RX ORDER — NALOXONE HYDROCHLORIDE 0.4 MG/ML
0.04 INJECTION, SOLUTION INTRAMUSCULAR; INTRAVENOUS; SUBCUTANEOUS AS NEEDED
Status: DISCONTINUED | OUTPATIENT
Start: 2018-12-14 | End: 2018-12-14 | Stop reason: HOSPADM

## 2018-12-14 RX ORDER — DEXAMETHASONE SODIUM PHOSPHATE 4 MG/ML
INJECTION, SOLUTION INTRA-ARTICULAR; INTRALESIONAL; INTRAMUSCULAR; INTRAVENOUS; SOFT TISSUE AS NEEDED
Status: DISCONTINUED | OUTPATIENT
Start: 2018-12-14 | End: 2018-12-14 | Stop reason: HOSPADM

## 2018-12-14 RX ORDER — HYDROMORPHONE HYDROCHLORIDE 2 MG/ML
INJECTION, SOLUTION INTRAMUSCULAR; INTRAVENOUS; SUBCUTANEOUS AS NEEDED
Status: DISCONTINUED | OUTPATIENT
Start: 2018-12-14 | End: 2018-12-14 | Stop reason: HOSPADM

## 2018-12-14 RX ADMIN — FENTANYL CITRATE 50 MCG: 50 INJECTION, SOLUTION INTRAMUSCULAR; INTRAVENOUS at 13:46

## 2018-12-14 RX ADMIN — ONDANSETRON 4 MG: 2 INJECTION INTRAMUSCULAR; INTRAVENOUS at 14:53

## 2018-12-14 RX ADMIN — GLYCOPYRROLATE 0.2 MG: 0.2 INJECTION INTRAMUSCULAR; INTRAVENOUS at 12:50

## 2018-12-14 RX ADMIN — SODIUM CHLORIDE, SODIUM LACTATE, POTASSIUM CHLORIDE, AND CALCIUM CHLORIDE: 600; 310; 30; 20 INJECTION, SOLUTION INTRAVENOUS at 13:30

## 2018-12-14 RX ADMIN — KETOROLAC TROMETHAMINE 30 MG: 30 INJECTION, SOLUTION INTRAMUSCULAR; INTRAVENOUS at 13:46

## 2018-12-14 RX ADMIN — CEFAZOLIN SODIUM 2 G: 2 SOLUTION INTRAVENOUS at 12:48

## 2018-12-14 RX ADMIN — ACETAMINOPHEN 1000 MG: 500 TABLET ORAL at 11:30

## 2018-12-14 RX ADMIN — Medication 100 MCG: at 13:11

## 2018-12-14 RX ADMIN — LIDOCAINE HYDROCHLORIDE 60 MG: 20 INJECTION, SOLUTION EPIDURAL; INFILTRATION; INTRACAUDAL; PERINEURAL at 12:56

## 2018-12-14 RX ADMIN — FENTANYL CITRATE 100 MCG: 50 INJECTION, SOLUTION INTRAMUSCULAR; INTRAVENOUS at 13:31

## 2018-12-14 RX ADMIN — SUCCINYLCHOLINE CHLORIDE 100 MG: 20 INJECTION INTRAMUSCULAR; INTRAVENOUS at 12:56

## 2018-12-14 RX ADMIN — MIDAZOLAM HYDROCHLORIDE 2 MG: 1 INJECTION, SOLUTION INTRAMUSCULAR; INTRAVENOUS at 12:50

## 2018-12-14 RX ADMIN — ROCURONIUM BROMIDE 5 MG: 10 INJECTION, SOLUTION INTRAVENOUS at 12:56

## 2018-12-14 RX ADMIN — PROPOFOL 50 MG: 10 INJECTION, EMULSION INTRAVENOUS at 13:25

## 2018-12-14 RX ADMIN — FENTANYL CITRATE 100 MCG: 50 INJECTION, SOLUTION INTRAMUSCULAR; INTRAVENOUS at 12:56

## 2018-12-14 RX ADMIN — DEXAMETHASONE SODIUM PHOSPHATE 4 MG: 4 INJECTION, SOLUTION INTRA-ARTICULAR; INTRALESIONAL; INTRAMUSCULAR; INTRAVENOUS; SOFT TISSUE at 13:11

## 2018-12-14 RX ADMIN — SODIUM CHLORIDE, SODIUM LACTATE, POTASSIUM CHLORIDE, AND CALCIUM CHLORIDE 75 ML/HR: 600; 310; 30; 20 INJECTION, SOLUTION INTRAVENOUS at 11:30

## 2018-12-14 RX ADMIN — ONDANSETRON 4 MG: 2 INJECTION INTRAMUSCULAR; INTRAVENOUS at 12:50

## 2018-12-14 RX ADMIN — PROPOFOL 150 MG: 10 INJECTION, EMULSION INTRAVENOUS at 12:56

## 2018-12-14 RX ADMIN — HYDROMORPHONE HYDROCHLORIDE 0.5 MG: 2 INJECTION, SOLUTION INTRAMUSCULAR; INTRAVENOUS; SUBCUTANEOUS at 13:51

## 2018-12-14 RX ADMIN — FAMOTIDINE 20 MG: 20 TABLET ORAL at 11:30

## 2018-12-14 RX ADMIN — METOPROLOL TARTRATE 2 MG: 5 INJECTION INTRAVENOUS at 13:45

## 2018-12-14 NOTE — PROGRESS NOTES
Date of Surgery Update:  Isreal Mares was seen and examined. History and physical has been reviewed. The patient has been examined.  There have been no significant clinical changes since the completion of the originally dated History and Physical.    Signed By: Nasrin Alberto MD     December 14, 2018 12:39 PM

## 2018-12-14 NOTE — ANESTHESIA PREPROCEDURE EVALUATION
Anesthetic History   No history of anesthetic complications            Review of Systems / Medical History  Patient summary reviewed and pertinent labs reviewed    Pulmonary  Within defined limits                 Neuro/Psych   Within defined limits           Cardiovascular    Hypertension: well controlled                   GI/Hepatic/Renal     GERD: well controlled           Endo/Other        Morbid obesity     Other Findings              Physical Exam    Airway  Mallampati: II  TM Distance: 4 - 6 cm  Neck ROM: normal range of motion   Mouth opening: Normal     Cardiovascular               Dental  No notable dental hx       Pulmonary                 Abdominal  GI exam deferred       Other Findings            Anesthetic Plan    ASA: 3  Anesthesia type: general          Induction: Intravenous  Anesthetic plan and risks discussed with: Patient

## 2018-12-14 NOTE — ANESTHESIA POSTPROCEDURE EVALUATION
Procedure(s):  LEFT KNEE ARTHROSCOPIC PARTIAL MEDIAL MENISECTOMY.     Anesthesia Post Evaluation      Multimodal analgesia: multimodal analgesia used between 6 hours prior to anesthesia start to PACU discharge  Patient location during evaluation: bedside  Patient participation: complete - patient participated  Level of consciousness: awake  Pain management: adequate  Airway patency: patent  Anesthetic complications: no  Cardiovascular status: stable  Respiratory status: acceptable  Hydration status: acceptable  Post anesthesia nausea and vomiting:  controlled      Visit Vitals  /70 (BP Patient Position: At rest)   Pulse 73   Temp 35.9 °C (96.6 °F)   Resp 15   Ht 5' 1\" (1.549 m)   Wt 86.2 kg (190 lb)   SpO2 93%   BMI 35.90 kg/m²

## 2018-12-14 NOTE — BRIEF OP NOTE
BRIEF OPERATIVE NOTE    Date of Procedure: 12/14/2018   Preoperative Diagnosis: S83.242D ACUTE MEDIAL MENISCUS TEAR, LEFT, SUBSEQUENT ENCOUNTER  Postoperative Diagnosis: S83.242D ACUTE MEDIAL and lateeral MENISCUS TEAR, LEFT, SUBSEQUENT ENCOUNTER    Procedure(s):  LEFT KNEE ARTHROSCOPIC PARTIAL MEDIAL,lateral MENISECTOMY  Surgeon(s) and Role:     Alondra Gray MD - Primary         Surgical Assistant: Amita Watson    Surgical Staff:  Circ-1: Luis Alfredo Stevens RN  Scrub Tech-1: Edward Hernandez  Surg Asst-1: Eleuterio Mason  Event Time In Time Out   Incision Start 1321    Incision Close       Anesthesia: General   Estimated Blood Loss: minimal  Specimens: * No specimens in log *   Findings: as above   Complications: none  Implants: * No implants in log *

## 2018-12-14 NOTE — DISCHARGE INSTRUCTIONS
Dr. Carmen Cramer Knee Arthroscopy  Postoperative Information    You will be given a prescription for pain medication. It may be taken every 4-6 hours as needed for the first 4-5 days. You may elevate your leg and place an ice pack on top of the dressing in  order to prevent swelling. A soft bandage was placed on your knee to soak up blood and fluid. You may take the bandage off the day after surgery, carefully cleaning the incision sites with peroxide. Band-Aids should be used for the first 4-5 days over each incision. There are 2 small incisions in your knee that may be sore and develop bruising over the next several days. This should resolve over the next few weeks. No special care will be needed. You should expect swelling in the area. You may elevate your leg and apply an icepack on top of the dressing to help minimize the swelling. Deep massage to the lower leg may also be utilized. It is safe to take a shower two days after surgery. You may begin bearing weight on your leg with the use of crutches for the first 4-5 days. Apply as much weight as tolerated so that at the end of 4-5 days, you can walk without crutches. Avoid prolonged walking or standing during the first few weeks after surgery. During your first week please work on gentle range of motion of your knee. Even though your incisions are small, there has been an operation inside and around the knee joint. Complete healing may take several months. If you have a high temperature, unexpected pain, redness or swelling, or any drainage around your knee area, please call my office immediately. Please make an appointment to return to my office in one week.     Dr. Carmen Cramer office number [phone]    DISCHARGE SUMMARY from Nurse    PATIENT INSTRUCTIONS:    After general anesthesia or intravenous sedation, for 24 hours or while taking prescription Narcotics:  · Limit your activities  · Do not drive and operate hazardous machinery  · Do not make important personal or business decisions  · Do  not drink alcoholic beverages  · If you have not urinated within 8 hours after discharge, please contact your surgeon on call. Report the following to your surgeon:  · Excessive pain, swelling, redness or odor of or around the surgical area  · Temperature over 100.5  · Nausea and vomiting lasting longer than 4 hours or if unable to take medications  · Any signs of decreased circulation or nerve impairment to extremity: change in color, persistent  numbness, tingling, coldness or increase pain  · Any questions    *  Please give a list of your current medications to your Primary Care Provider. *  Please update this list whenever your medications are discontinued, doses are      changed, or new medications (including over-the-counter products) are added. *  Please carry medication information at all times in case of emergency situations. These are general instructions for a healthy lifestyle:    No smoking/ No tobacco products/ Avoid exposure to second hand smoke  Surgeon General's Warning:  Quitting smoking now greatly reduces serious risk to your health. Obesity, smoking, and sedentary lifestyle greatly increases your risk for illness    A healthy diet, regular physical exercise & weight monitoring are important for maintaining a healthy lifestyle    You may be retaining fluid if you have a history of heart failure or if you experience any of the following symptoms:  Weight gain of 3 pounds or more overnight or 5 pounds in a week, increased swelling in our hands or feet or shortness of breath while lying flat in bed. Please call your doctor as soon as you notice any of these symptoms; do not wait until your next office visit.     Recognize signs and symptoms of STROKE:    F-face looks uneven    A-arms unable to move or move unevenly    S-speech slurred or non-existent    T-time-call 911 as soon as signs and symptoms begin-DO NOT go       Back to bed or wait to see if you get better-TIME IS BRAIN. Warning Signs of HEART ATTACK     Call 911 if you have these symptoms:   Chest discomfort. Most heart attacks involve discomfort in the center of the chest that lasts more than a few minutes, or that goes away and comes back. It can feel like uncomfortable pressure, squeezing, fullness, or pain.  Discomfort in other areas of the upper body. Symptoms can include pain or discomfort in one or both arms, the back, neck, jaw, or stomach.  Shortness of breath with or without chest discomfort.  Other signs may include breaking out in a cold sweat, nausea, or lightheadedness. Don't wait more than five minutes to call 911 - MINUTES MATTER! Fast action can save your life. Calling 911 is almost always the fastest way to get lifesaving treatment. Emergency Medical Services staff can begin treatment when they arrive -- up to an hour sooner than if someone gets to the hospital by car. The discharge information has been reviewed with the patient. The patient verbalized understanding. Discharge medications reviewed with the patient and appropriate educational materials and side effects teaching were provided.   ___________________________________________________________________________________________________________________________________

## 2018-12-15 NOTE — OP NOTES
[facility]  OPERATIVE REPORT    Vergil Kehr  MR#: [id number]  : 1960  ACCOUNT #: [de-identified]   DATE OF SERVICE: 2018    PREOPERATIVE DIAGNOSIS:  Medial and lateral meniscus tear, left knee. POSTOPERATIVE DIAGNOSIS:  Medial and lateral meniscus tear, left knee. PROCEDURE:  Arthroscopic partial medial and lateral meniscectomy, left knee. SURGEON:  Coco Infante MD    ASSISTANT:  Cata Gerber    ESTIMATED BLOOD LOSS:  Minimal.    COMPLICATIONS:  None. SPECIMENS REMOVED:  None. IMPLANTS:  None. FINDINGS:  As above. ANESTHESIA:  General.    BRIEF HISTORY:  The patient has had significant amount of problems with the left knee not responding to conservative treatment and was consented for surgery after having discussion at length the possible risks and complications of surgery including infection, bleeding, recurrence of pain and all other possible problems. PROCEDURE IN DETAIL:  The patient was taken to the operating room, induced under general endotracheal by anesthesia staff, placed in the center arthroscopy raygoza. The left leg was prepped with ChloraPrep solution, draped as a free sterile field. An anterolateral portal was used as an arthroscopy portal and inferomedial portal as a work portal.  Portals were made with 11 blade followed by blunt trocars. Once the arthroscope was placed in the knee was systematically evaluated and inside the suprapatellar pouch, patellofemoral joint, diffuse degenerative changes are noted, and on the medial compartment diffuse degenerative changes are noted as well, grade III, almost grade IV changes and a radial tear of the posterior medial attachment which was debrided using a straight basket forceps, a 3.5 shaver, leaving a stable rim. Lateral compartment, there was a tear in the posterolateral attachment that was debrided using straight basket forceps and 3.5 shaver, leaving a stable rim.   Diffuse degenerative changes noted as well, and the notch was very narrow, given the spurs in the notch. Fluid was suctioned out and the knee was injected with a mixture of Marcaine. Portals were closed with 4-0 Monocryl. Sterile dressings were applied. Patient tolerated the procedure well and was taken to recovery room without problems.       MD KARLEY Lewis / SHILPA  D: 12/14/2018 15:48     T: 12/14/2018 22:57  JOB #: [id number]

## 2018-12-17 ENCOUNTER — TELEPHONE (OUTPATIENT)
Dept: ORTHOPEDIC SURGERY | Age: 58
End: 2018-12-17

## 2018-12-17 NOTE — TELEPHONE ENCOUNTER
Patient called stating she was told to take her bandage off to clean her wound today, but she is wondering if she has to take off her clear bandage that is protecting her incision. Please advise patient back regarding this at [phone].

## 2018-12-17 NOTE — TELEPHONE ENCOUNTER
Spoke with patient, notified her per KRIS Frank to leave the clear bandage on and that we will remove it at her post-op appointment. Patient verbalized understanding.

## 2018-12-21 ENCOUNTER — OFFICE VISIT (OUTPATIENT)
Dept: ORTHOPEDIC SURGERY | Age: 58
End: 2018-12-21

## 2018-12-21 VITALS
RESPIRATION RATE: 16 BRPM | HEART RATE: 83 BPM | DIASTOLIC BLOOD PRESSURE: 76 MMHG | WEIGHT: 190.2 LBS | OXYGEN SATURATION: 96 % | TEMPERATURE: 95.8 F | BODY MASS INDEX: 35.91 KG/M2 | SYSTOLIC BLOOD PRESSURE: 125 MMHG | HEIGHT: 61 IN

## 2018-12-21 DIAGNOSIS — M17.12 PRIMARY OSTEOARTHRITIS OF LEFT KNEE: ICD-10-CM

## 2018-12-21 DIAGNOSIS — Z98.890 S/P ARTHROSCOPIC PARTIAL MEDIAL MENISCECTOMY: Primary | ICD-10-CM

## 2018-12-21 DIAGNOSIS — S83.207D ACUTE MENISCAL TEAR OF LEFT KNEE, SUBSEQUENT ENCOUNTER: ICD-10-CM

## 2018-12-21 NOTE — PROGRESS NOTES
Patient: Livier Flower  YOB: 1960       HISTORY:  The patient presents for reevaluation of her left knee status post arthroscopic partial medial and lateral menisectomy with degenerative arthritis on 12/14/18. Patient is improved, states pain is a 3 out of 10.  she has not gone to physical therapy. Patient denies any fever, chills, chest pain, shortness of breath or calf pain. The remainder of the review of systems is negative. There are no new illness or injuries to report since last seen in the office. No changes in medications, allergies, social or family history. PHYSICAL EXAMINATION:    Visit Vitals  /76   Pulse 83   Temp 95.8 °F (35.4 °C) (Oral)   Resp 16   Ht 5' 1\" (1.549 m)   Wt 190 lb 3.2 oz (86.3 kg)   SpO2 96%   BMI 35.94 kg/m²     The patient is a well-developed, well-nourished female in no acute distress. The patient is alert and oriented times three. The patient appears to be well groomed. Mood and affect are normal.   ORTHOPEDIC EXAM of Left knee: Inspection: Effusion present,  incisions clean, dry intact, sutures in place  TTP: medial joint line  Range of motion: 0-120 flexion  Stability: Stable  Strength: 5/5  2+ distal pulses    IMPRESSION:  Status post Left knee arthroscopic partial medial and lateral menisectomy with degen arthritis with joint space narrowing. PLAN: Incisions cleaned. Surgery was discussed at length today. Stressed to patient that nothing causes an increase in pain or swelling. Patient is weight bearing as tolerated. OK to d/c use of crutches/walker. Will set up with physical therapy. Patient does not request refill of pain medication. Patient will follow up in 3 weeks.     EVELYN Gray [facility]

## 2018-12-21 NOTE — PROGRESS NOTES
1. Have you been to the ER, urgent care clinic since your last visit? Hospitalized since your last visit? No    2. Have you seen or consulted any other health care providers outside of the [address] [place] [address] since your last visit? Include any pap smears or colon screening.  No

## 2018-12-31 ENCOUNTER — HOSPITAL ENCOUNTER (OUTPATIENT)
Dept: PHYSICAL THERAPY | Age: 58
Discharge: HOME | End: 2018-12-31
Payer: COMMERCIAL

## 2018-12-31 PROCEDURE — 97162 PT EVAL MOD COMPLEX 30 MIN: CPT

## 2018-12-31 PROCEDURE — 97110 THERAPEUTIC EXERCISES: CPT

## 2018-12-31 NOTE — PROGRESS NOTES
PT DAILY TREATMENT NOTE 10-18    Patient Name: Sherri Valencia  Date:2018  : 1960  [x]  Patient  Verified  Payor: Janet Torres / Plan: Real Colla / Product Type: HMO /    In time:2:04  Out time:2:44  Total Treatment Time (min): 44  Visit #: 1 of 8    Medicare/BCBS Only   Total Timed Codes (min):  24 1:1 Treatment Time:  44       Treatment Area: Left knee pain [M25.562]    SUBJECTIVE  Pain Level (0-10 scale): 2-3  Any medication changes, allergies to medications, adverse drug reactions, diagnosis change, or new procedure performed?: [x] No    [] Yes (see summary sheet for update)  Subjective functional status/changes:   [] No changes reported  Patient is a 63 y.o female presenting s/p left knee partial lateral and medial menisectomy on 18. Patient states her pain is intermittent and typically stays around 2-3/10. Patient states she has been able to ambulate without the crutches without increase in pain, however notices she is walking different than before. Patient states she is currently unable to ambulate up/down steps so she has been staying with her mom since her mom doesn't have steps. OBJECTIVE      24 min Therapeutic Exercise:  [x] See flow sheet :   Rationale: increase ROM and increase strength to improve the patients ability to perform ADLs with improved mobility. With   [] TE   [] TA   [] neuro   [] other: Patient Education: [x] Review HEP    [] Progressed/Changed HEP based on:   [] positioning   [] body mechanics   [] transfers   [] heat/ice application    [] other:      Other Objective/Functional Measures: See IE     Pain Level (0-10 scale) post treatment: 2-3    ASSESSMENT/Changes in Function: Patient presents with impairments consistent with decreased ROM, decreased strength, slight antalgic gait with decreased stance time on LLE, increased joint effusion, and decreased muscle flexibility.  Patient educated on importance of rest, ice, compression, and elevation in order to decrease joint swelling. Patient will continue to benefit from skilled PT services to modify and progress therapeutic interventions, address functional mobility deficits, address ROM deficits, address strength deficits, analyze and address soft tissue restrictions, analyze and cue movement patterns, analyze and modify body mechanics/ergonomics and assess and modify postural abnormalities to attain remaining goals. [x]  See Plan of Care  []  See progress note/recertification  []  See Discharge Summary         Progress towards goals / Updated goals:  Short Term Goals: To be accomplished in 2 weeks:               1. Patient will be independent and compliant with HEP in order to improve functional mobility. 2. Patient will be able to decrease left knee pain to no more than 0/10 in order to improve ease of ADLs. Long Term Goals: To be accomplished in 4 weeks:               1. Patient will be able to improve FOTO score to 78 in order to demonstrate improvements in functional independence. 2. Patient will be able to improve left knee AROM to 0-120 in order to improve ease of ADLs. 3. Patient will be able to perform a squat with proper mechanics without difficulty in order to return to PLOF. 4. Patient will be able to go up and down a flight of stairs without difficulty in order to improve functional mobility.      PLAN  []  Upgrade activities as tolerated     [x]  Continue plan of care  []  Update interventions per flow sheet       []  Discharge due to:_  []  Other:_      Balbir Sethi, PT 12/31/2018  2:43 PM    Future Appointments   Date Time Provider Ha Whitten   1/11/2019  1:45 PM KRIS Malik

## 2018-12-31 NOTE — PROGRESS NOTES
[facility]  [address] [facility] [address]  [place], [address]  [phone] fax    Plan of Care/ Statement of Necessity for Physical Therapy Services    Patient name: Chad Olivas Start of Care: 2018   Referral source: Simona Mccoy AlaBanner : 1960    Medical Diagnosis: Left knee pain [M25.562]  Payor: Sanjuanita Vanessa / Plan: Puma Castaneda / Product Type: HMO /  Onset Date:2018 DOS: 18    Treatment Diagnosis: Left knee partial lateral and medial menisectomy    Prior Hospitalization: see medical history Provider#: [id number]   Medications: Verified on Patient summary List    Comorbidities: arthritis, BMI >30, DM, high blood pressure   Prior Level of Function: (I) with all ADLs and recreational activities     The Plan of Care and following information is based on the information from the initial evaluation. Assessment/ key information: Patient is a 63 y.o female presenting s/p left knee partial lateral and medial menisectomy on 18. Patient states her pain is intermittent and typically stays around 2-3/10. Patient states she has been able to ambulate without the crutches without increase in pain, however notices she is walking different than before. Patient states she is currently unable to ambulate up/down steps so she has been staying with her mom since her mom doesn't have steps. Patient presents with impairments consistent with decreased ROM, decreased strength, slight antalgic gait with decreased stance time on LLE, increased joint effusion, and decreased muscle flexibility. Patient educated on importance of rest, ice, compression, and elevation in order to decrease joint swelling. Patient will benefit from skilled PT in order to address these deficits.    Evaluation Complexity History MEDIUM  Complexity : 1-2 comorbidities / personal factors will impact the outcome/ POC ; Examination MEDIUM Complexity : 3 Standardized tests and measures addressing body structure, function, activity limitation and / or participation in recreation  ;Presentation MEDIUM Complexity : Evolving with changing characteristics  ; Clinical Decision Making LOW Complexity : FOTO score of   Overall Complexity Rating: MEDIUM  Problem List: pain affecting function, decrease ROM, decrease strength, edema affecting function, impaired gait/ balance, decrease ADL/ functional abilitiies, decrease activity tolerance and decrease flexibility/ joint mobility   Treatment Plan may include any combination of the following: Therapeutic exercise, Therapeutic activities, Neuromuscular re-education, Physical agent/modality, Gait/balance training, Manual therapy, Patient education, Functional mobility training and Stair training  Patient / Family readiness to learn indicated by: asking questions, trying to perform skills and interest  Persons(s) to be included in education: patient (P)  Barriers to Learning/Limitations: None  Patient Goal (s): To get my mobility back  Patient Self Reported Health Status: good  Rehabilitation Potential: good    Short Term Goals: To be accomplished in 2 weeks:   1. Patient will be independent and compliant with HEP in order to improve functional mobility. 2. Patient will be able to decrease left knee pain to no more than 0/10 in order to improve ease of ADLs. Long Term Goals: To be accomplished in 4 weeks:   1. Patient will be able to improve FOTO score to 78 in order to demonstrate improvements in functional independence. 2. Patient will be able to improve left knee AROM to 0-120 in order to improve ease of ADLs. 3. Patient will be able to perform a squat with proper mechanics without difficulty in order to return to PLOF. 4. Patient will be able to go up and down a flight of stairs without difficulty in order to improve functional mobility. Frequency / Duration: Patient to be seen 2 times per week for 4 weeks.     Patient/ Caregiver education and instruction: Diagnosis, prognosis, self care, activity modification and exercises   [x]  Plan of care has been reviewed with INDIRA Jorgensen, PT 12/31/2018 2:43 PM    ________________________________________________________________________    I certify that the above Therapy Services are being furnished while the patient is under my care. I agree with the treatment plan and certify that this therapy is necessary.     Physician's Signature:____________Date:_________TIME:________    ** Signature, Date and Time must be completed for valid certification **    Please sign and return to [facility] [address] [place] [address] & [facility]  [address]  [place], [facility] [address]  [phone] fax

## 2019-01-02 ENCOUNTER — HOSPITAL ENCOUNTER (OUTPATIENT)
Dept: PHYSICAL THERAPY | Age: 59
Discharge: HOME | End: 2019-01-02
Payer: COMMERCIAL

## 2019-01-02 PROCEDURE — 97110 THERAPEUTIC EXERCISES: CPT

## 2019-01-02 PROCEDURE — 97140 MANUAL THERAPY 1/> REGIONS: CPT

## 2019-01-02 PROCEDURE — 97016 VASOPNEUMATIC DEVICE THERAPY: CPT

## 2019-01-02 NOTE — PROGRESS NOTES
PT DAILY TREATMENT NOTE 10-18    Patient Name: Issac Ibarra  Date:2019  : 1960  [x]  Patient  Verified  Payor: Jesi Melton / Plan: Mariely Carolina / Product Type: HMO /    In time:2:00  Out time:2:45  Total Treatment Time (min): 45  Visit #: 2 of 8    Medicare/BCBS Only   Total Timed Codes (min):  35 1:1 Treatment Time:  30       Treatment Area: Left knee pain [M25.562]    SUBJECTIVE  Pain Level (0-10 scale): 0  Any medication changes, allergies to medications, adverse drug reactions, diagnosis change, or new procedure performed?: [x] No    [] Yes (see summary sheet for update)  Subjective functional status/changes:   [] No changes reported  Patient reports HEP compliance.      OBJECTIVE    27 min Therapeutic Exercise:  [x] See flow sheet :   Rationale: increase ROM, increase strength and improve coordination to improve the patients ability to increase ease with ADLs         8 min Manual Therapy:  PROM to left knee, left patellar mobs   Rationale: decrease pain, increase ROM and increase tissue extensibility to ease ADL tolerance    Modality rationale: decrease pain to improve the patients ability to ease soreness after therapy   Min Type Additional Details    [] Estim:  []Unatt       []IFC  []Premod                        []Other:  []w/ice   []w/heat  Position:  Location:    [] Estim: []Att    []TENS instruct  []NMES                    []Other:  []w/US   []w/ice   []w/heat  Position:  Location:    []  Traction: [] Cervical       []Lumbar                       [] Prone          []Supine                       []Intermittent   []Continuous Lbs:  [] before manual  [] after manual    []  Ultrasound: []Continuous   [] Pulsed                           []1MHz   []3MHz Location:  W/cm2:    []  Iontophoresis with dexamethasone         Location: [] Take home patch   [] In clinic    []  Ice     []  heat  []  Ice massage  []  Laser   []  Anodyne Position:  Location:    []  Laser with stim  []  Other: Position:  Location:   10 [x]  Vasopneumatic Device Pressure:       [x] lo [] med [] hi   Temperature: [x] lo [] med [] hi   [] Skin assessment post-treatment:  []intact []redness- no adverse reaction    []redness - adverse reaction:          With   [] TE   [] TA   [] neuro   [] other: Patient Education: [x] Review HEP    [] Progressed/Changed HEP based on:   [] positioning   [] body mechanics   [] transfers   [] heat/ice application    [] other:      Other Objective/Functional Measures:   First follow up session---cues sequencing and correct form throughout     Pain Level (0-10 scale) post treatment: 0    ASSESSMENT/Changes in Function:   Initiated POC per flowsheet. Patient puts forth good effort with exercises and reports HEP compliance. She requires cues to avoid rushing through reps. Decreased proximal hip stability. She has little to no difficulty with exercises. Encouraged her to avoid overdoing and remain consistent with HEP. Continue to progress activities if pain remains low. Patient will continue to benefit from skilled PT services to modify and progress therapeutic interventions, address functional mobility deficits, address ROM deficits, address strength deficits, analyze and address soft tissue restrictions, analyze and cue movement patterns, analyze and modify body mechanics/ergonomics and assess and modify postural abnormalities to attain remaining goals. []  See Plan of Care  []  See progress note/recertification  []  See Discharge Summary         Progress towards goals / Updated goals:  Short Term Goals: To be accomplished in 2 weeks:               1. Patient will be independent and compliant with HEP in order to improve functional mobility. Met per patient report (1/2/2019)               2. Patient will be able to decrease left knee pain to no more than 0/10 in order to improve ease of ADLs. Long Term Goals: To be accomplished in 4 weeks:               1.  Patient will be able to improve FOTO score to 78 in order to demonstrate improvements in functional independence.                2. Patient will be able to improve left knee AROM to 0-120 in order to improve ease of ADLs.              3. Patient will be able to perform a squat with proper mechanics without difficulty in order to return to PLOF.              4. Patient will be able to go up and down a flight of stairs without difficulty in order to improve functional mobility.          PLAN  []  Upgrade activities as tolerated     [x]  Continue plan of care  []  Update interventions per flow sheet       []  Discharge due to:_  []  Other:_      Colby Polanco 1/2/2019  1:59 PM    Future Appointments   Date Time Provider Ha Whitten   1/2/2019  2:00 PM Dot Dux [facility]   1/8/2019  1:00 PM Dot Dux [facility]   1/10/2019  2:00 PM Dot Dux [facility]   1/11/2019  1:45 PM KRIS Del[address]   1/15/2019  1:30 PM Dot Dux [facility]   1/17/2019  3:30 PM Braulio Desouza PT [facility]   1/22/2019  1:00 PM Dot Dux [facility]   1/24/2019 12:00 PM Braulio Desouza PT [facility]

## 2019-01-08 ENCOUNTER — HOSPITAL ENCOUNTER (OUTPATIENT)
Dept: PHYSICAL THERAPY | Age: 59
Discharge: HOME | End: 2019-01-08
Payer: COMMERCIAL

## 2019-01-08 PROCEDURE — 97110 THERAPEUTIC EXERCISES: CPT

## 2019-01-08 PROCEDURE — 97016 VASOPNEUMATIC DEVICE THERAPY: CPT

## 2019-01-08 PROCEDURE — 97112 NEUROMUSCULAR REEDUCATION: CPT

## 2019-01-08 NOTE — PROGRESS NOTES
PT DAILY TREATMENT NOTE 10-18    Patient Name: Aura Abreu  Date:2019  : 1960  [x]  Patient  Verified  Payor: Jorge Luis Reyes / Plan: Megan Ort / Product Type: HMO /    In time:1:04  Out time:1:46  Total Treatment Time (min): 42  Visit #: 3 of 8    Medicare/BCBS Only   Total Timed Codes (min):  32 1:1 Treatment Time:  32       Treatment Area: Left knee pain [M25.562]    SUBJECTIVE  Pain Level (0-10 scale): 0  Any medication changes, allergies to medications, adverse drug reactions, diagnosis change, or new procedure performed?: [x] No    [] Yes (see summary sheet for update)  Subjective functional status/changes:   [] No changes reported  Patient reports no soreness after first follow up.      OBJECTIVE  Modality rationale: decrease pain to improve the patients ability to ease soreness after therapy   Min Type Additional Details    [] Estim:  []Unatt       []IFC  []Premod                        []Other:  []w/ice   []w/heat  Position:  Location:    [] Estim: []Att    []TENS instruct  []NMES                    []Other:  []w/US   []w/ice   []w/heat  Position:  Location:    []  Traction: [] Cervical       []Lumbar                       [] Prone          []Supine                       []Intermittent   []Continuous Lbs:  [] before manual  [] after manual    []  Ultrasound: []Continuous   [] Pulsed                           []1MHz   []3MHz Location:  W/cm2:    []  Iontophoresis with dexamethasone         Location: [] Take home patch   [] In clinic    []  Ice     []  heat  []  Ice massage  []  Laser   []  Anodyne Position:  Location:    []  Laser with stim  []  Other: Position:  Location:   10 [x]  Vasopneumatic Device Pressure:       [x] lo [] med [] hi   Temperature: [x] lo [] med [] hi   [] Skin assessment post-treatment:  []intact []redness- no adverse reaction    []redness - adverse reaction:       22 min Therapeutic Exercise:  [x] See flow sheet :   Rationale: increase ROM, increase strength and improve coordination to improve the patients ability to increase ease with ADLs      10 min Neuromuscular Re-education:  [x]  See flow sheet :   Rationale: increase strength, improve coordination, improve balance and increase proprioception  to improve the patients ability to improve eccentric control             With   [] TE   [] TA   [] neuro   [] other: Patient Education: [x] Review HEP    [] Progressed/Changed HEP based on:   [] positioning   [] body mechanics   [] transfers   [] heat/ice application    [] other:      Other Objective/Functional Measures: Added activities per flowsheet     Pain Level (0-10 scale) post treatment: 0    ASSESSMENT/Changes in Function:   Patient continues with excellent progress towards initial goals. ROM is WNL though lacking in proximal hip strength. Continue to work towards increased ease with ADLs into gym-related activities for progression. Patient will continue to benefit from skilled PT services to modify and progress therapeutic interventions, address functional mobility deficits, address ROM deficits, address strength deficits, analyze and address soft tissue restrictions, analyze and cue movement patterns, analyze and modify body mechanics/ergonomics and assess and modify postural abnormalities to attain remaining goals. []  See Plan of Care  []  See progress note/recertification  []  See Discharge Summary         Progress towards goals / Updated goals:  Short Term Goals: To be accomplished in 2 weeks:               1. Patient will be independent and compliant with HEP in order to improve functional mobility. Met per patient report (1/2/2019)               2. Patient will be able to decrease left knee pain to no more than 0/10 in order to improve ease of ADLs. Met per patient report (1/8/2019)  [address] be accomplished in 4 weeks:               1.  Patient will be able to improve FOTO score to 78 in order to demonstrate improvements in functional independence.                2. Patient will be able to improve left knee AROM to 0-120 in order to improve ease of ADLs.              3. Patient will be able to perform a squat with proper mechanics without difficulty in order to return to PLOF.                 4. Patient will be able to go up and down a flight of stairs without difficulty in order to improve functional mobility.         PLAN  []  Upgrade activities as tolerated     [x]  Continue plan of care  []  Update interventions per flow sheet       []  Discharge due to:_  []  Other:_      Ronel Navarrete 1/8/2019  1:06 PM    Future Appointments   Date Time Provider Ha Whitten   1/10/2019  2:00 PM Robbert Goodpasture [facility]   1/11/2019  1:45 PM KRIS Carrasco [address]   1/15/2019  1:30 PM Robbert Goodpasture [facility]   1/17/2019  3:30 PM Juancarlos Wynnor, PT [facility]   1/22/2019  1:00 PM Robbert Goodpasture [facility]   1/24/2019 12:00 PM Juancarlos Carson, PT [facility]

## 2019-01-10 ENCOUNTER — HOSPITAL ENCOUNTER (OUTPATIENT)
Dept: PHYSICAL THERAPY | Age: 59
Discharge: HOME | End: 2019-01-10
Payer: COMMERCIAL

## 2019-01-10 PROCEDURE — 97112 NEUROMUSCULAR REEDUCATION: CPT

## 2019-01-10 PROCEDURE — 97110 THERAPEUTIC EXERCISES: CPT

## 2019-01-10 PROCEDURE — 97016 VASOPNEUMATIC DEVICE THERAPY: CPT

## 2019-01-10 NOTE — PROGRESS NOTES
PT DAILY TREATMENT NOTE 10-18    Patient Name: Johanne Kennedy  Date:1/10/2019  : 1960  [x]  Patient  Verified  Payor: Elois Schlatter / Plan: Jayashree Mask / Product Type: HMO /    In time:1:59  Out time:2:44  Total Treatment Time (min): 45  Visit #: 4 of 8    Medicare/BCBS Only   Total Timed Codes (min):  35 1:1 Treatment Time:  35       Treatment Area: Left knee pain [M25.562]    SUBJECTIVE  Pain Level (0-10 scale): 0  Any medication changes, allergies to medications, adverse drug reactions, diagnosis change, or new procedure performed?: [x] No    [] Yes (see summary sheet for update)  Subjective functional status/changes:   [] No changes reported  Patient reports some stiffness due to change in weather.      OBJECTIVE    25 min Therapeutic Exercise:  [x] See flow sheet :   Rationale: increase ROM, increase strength and improve coordination to improve the patients ability to increase ease with ADLs       10 min Neuromuscular Re-education:  [x]  See flow sheet :   Rationale: increase strength, improve coordination and increase proprioception  to improve the patients ability to improve eccentric quad control    Modality rationale: decrease pain to improve the patients ability to ease soreness after therapy   Min Type Additional Details    [] Estim:  []Unatt       []IFC  []Premod                        []Other:  []w/ice   []w/heat  Position:  Location:    [] Estim: []Att    []TENS instruct  []NMES                    []Other:  []w/US   []w/ice   []w/heat  Position:  Location:    []  Traction: [] Cervical       []Lumbar                       [] Prone          []Supine                       []Intermittent   []Continuous Lbs:  [] before manual  [] after manual    []  Ultrasound: []Continuous   [] Pulsed                           []1MHz   []3MHz Location:  W/cm2:    []  Iontophoresis with dexamethasone         Location: [] Take home patch   [] In clinic    []  Ice     []  heat  []  Ice massage  []  Laser   [] Anodyne Position:  Location:    []  Laser with stim  []  Other: Position:  Location:   10 [x]  Vasopneumatic Device Pressure:       [x] lo [] med [] hi   Temperature: [x] lo [] med [] hi   [] Skin assessment post-treatment:  []intact []redness- no adverse reaction    []redness - adverse reaction:               With   [] TE   [] TA   [] neuro   [] other: Patient Education: [x] Review HEP    [] Progressed/Changed HEP based on:   [] positioning   [] body mechanics   [] transfers   [] heat/ice application    [] other:      Other Objective/Functional Measures:   left knee AROM: 0-120 degrees     Pain Level (0-10 scale) post treatment: 0    ASSESSMENT/Changes in Function:   Patient with excellent progress towards initial goals. ROM and strength both improving. Most likely discharge in upcoming visits if she continues with good pain control. Patient will continue to benefit from skilled PT services to modify and progress therapeutic interventions, address functional mobility deficits, address ROM deficits, address strength deficits, analyze and address soft tissue restrictions, analyze and cue movement patterns, analyze and modify body mechanics/ergonomics and assess and modify postural abnormalities to attain remaining goals. []  See Plan of Care  []  See progress note/recertification  []  See Discharge Summary    Progress towards goals / Updated goals:  Short Term Goals: To be accomplished in 2 weeks:               1. Patient will be independent and compliant with HEP in order to improve functional mobility. Met per patient report (1/2/2019)               2. Patient will be able to decrease left knee pain to no more than 0/10 in order to improve ease of ADLs. Met per patient report (1/8/2019)  [address] be accomplished in 4 weeks:               1.  Patient will be able to improve FOTO score to 78 in order to demonstrate improvements in functional independence.                2. Patient will be able to improve left knee AROM to 0-120 in order to improve ease of ADLs. -met, 0-120 degrees (1/10/2019)               3. Patient will be able to perform a squat with proper mechanics without difficulty in order to return to PLOF.  Met (1/10/2019)               4. Patient will be able to go up and down a flight of stairs without difficulty in order to improve functional mobility.         PLAN  []  Upgrade activities as tolerated     [x]  Continue plan of care  []  Update interventions per flow sheet       []  Discharge due to:_  []  Other:_      James Rodríguez 1/10/2019  2:01 PM    Future Appointments   Date Time Provider Ha Whitten   1/11/2019  1:45 PM KRIS P[address]   [facility]   1/17/2019  3:30 PM FLAKITO [facility]   1/22/2019  1:00 PM Gary VIEYRA [facility]   1/24/2019 12:00 PM Abilio Santana PT [facility]

## 2019-01-11 ENCOUNTER — OFFICE VISIT (OUTPATIENT)
Dept: ORTHOPEDIC SURGERY | Age: 59
End: 2019-01-11

## 2019-01-11 DIAGNOSIS — S83.207D ACUTE MENISCAL TEAR OF LEFT KNEE, SUBSEQUENT ENCOUNTER: Primary | ICD-10-CM

## 2019-01-15 ENCOUNTER — HOSPITAL ENCOUNTER (OUTPATIENT)
Dept: PHYSICAL THERAPY | Age: 59
Discharge: HOME | End: 2019-01-15
Payer: COMMERCIAL

## 2019-01-15 PROCEDURE — 97112 NEUROMUSCULAR REEDUCATION: CPT

## 2019-01-15 PROCEDURE — 97110 THERAPEUTIC EXERCISES: CPT

## 2019-01-15 PROCEDURE — 97016 VASOPNEUMATIC DEVICE THERAPY: CPT

## 2019-01-15 NOTE — PROGRESS NOTES
PT DAILY TREATMENT NOTE 10-18    Patient Name: Autumn Zaldivar  Date:1/15/2019  : 1960  [x]  Patient  Verified  Payor: Darby Long / Plan: Daniel Blakely / Product Type: HMO /    In time:1:33  Out time:2:25  Total Treatment Time (min): 52  Visit #: 5 of 8    Medicare/BCBS Only   Total Timed Codes (min):  42 1:1 Treatment Time:  26       Treatment Area: Left knee pain [M25.562]    SUBJECTIVE  Pain Level (0-10 scale): 0  Any medication changes, allergies to medications, adverse drug reactions, diagnosis change, or new procedure performed?: [x] No    [] Yes (see summary sheet for update)  Subjective functional status/changes:   [] No changes reported  Patient reports no new complaints.      OBJECTIVE      32 min Therapeutic Exercise:  [x] See flow sheet :   Rationale: increase ROM, increase strength and improve coordination to improve the patients ability to increase ease with ADLs       10 min Neuromuscular Re-education:  [x]  See flow sheet :   Rationale: increase strength, improve coordination, improve balance and increase proprioception  to improve the patients ability to improve knee eccentric control    Modality rationale: decrease edema, decrease inflammation and decrease pain to improve the patients ability to ease ADL tolerance   Min Type Additional Details    [] Estim:  []Unatt       []IFC  []Premod                        []Other:  []w/ice   []w/heat  Position:  Location:    [] Estim: []Att    []TENS instruct  []NMES                    []Other:  []w/US   []w/ice   []w/heat  Position:  Location:    []  Traction: [] Cervical       []Lumbar                       [] Prone          []Supine                       []Intermittent   []Continuous Lbs:  [] before manual  [] after manual    []  Ultrasound: []Continuous   [] Pulsed                           []1MHz   []3MHz Location:  W/cm2:    []  Iontophoresis with dexamethasone         Location: [] Take home patch   [] In clinic    []  Ice     []  heat  [] Ice massage  []  Laser   []  Anodyne Position:  Location:    []  Laser with stim  []  Other: Position:  Location:   10 [x]  Vasopneumatic Device Pressure:       [x] lo [] med [] hi   Temperature: [x] lo [] med [] hi   [] Skin assessment post-treatment:  []intact []redness- no adverse reaction    []redness - adverse reaction:               With   [] TE   [] TA   [] neuro   [] other: Patient Education: [x] Review HEP    [] Progressed/Changed HEP based on:   [] positioning   [] body mechanics   [] transfers   [] heat/ice application    [] other:      Other Objective/Functional Measures:   Presents with glute fatigue towards end of session     Pain Level (0-10 scale) post treatment: 0    ASSESSMENT/Changes in Function:   Patient with difficulty with quad eccentric control with step downs and noted hip drop. Otherwise, strength is progressing nicely. Discharge with updated HEP in three visits. Patient will continue to benefit from skilled PT services to modify and progress therapeutic interventions, address functional mobility deficits, address ROM deficits, address strength deficits, analyze and address soft tissue restrictions, analyze and cue movement patterns, analyze and modify body mechanics/ergonomics and assess and modify postural abnormalities to attain remaining goals. []  See Plan of Care  []  See progress note/recertification  []  See Discharge Summary           Progress towards goals / Updated goals:  Short Term Goals: To be accomplished in 2 weeks:               1. Patient will be independent and compliant with HEP in order to improve functional mobility. Met per patient report (1/2/2019)               2. Patient will be able to decrease left knee pain to no more than 0/10 in order to improve ease of ADLs. Met per patient report (1/8/2019)  [address] be accomplished in 4 weeks:               1.  Patient will be able to improve FOTO score to 78 in order to demonstrate improvements in functional independence.                2. Patient will be able to improve left knee AROM to 0-120 in order to improve ease of ADLs. -met, 0-120 degrees (1/10/2019)               3. Patient will be able to perform a squat with proper mechanics without difficulty in order to return to PLOF.  Met (1/10/2019)               4. Patient will be able to go up and down a flight of stairs without difficulty in order to improve functional mobility. -met (1/15/2019)        PLAN  [x]  Upgrade activities as tolerated     [x]  Continue plan of care  []  Update interventions per flow sheet       []  Discharge due to:_  []  Other:_      Tricia Kiara 1/15/2019  1:35 PM    Future Appointments   Date Time Provider Ha Whitten   1/17/2019  3:30 PM Jared Rick PT [facility]   1/22[facility]2019  [facility]   1/24/2019 12:00 PM Jared Rick PT [facility]

## 2019-01-17 ENCOUNTER — HOSPITAL ENCOUNTER (OUTPATIENT)
Dept: PHYSICAL THERAPY | Age: 59
Discharge: HOME | End: 2019-01-17
Payer: COMMERCIAL

## 2019-01-17 PROCEDURE — 97112 NEUROMUSCULAR REEDUCATION: CPT

## 2019-01-17 PROCEDURE — 97016 VASOPNEUMATIC DEVICE THERAPY: CPT

## 2019-01-17 PROCEDURE — 97110 THERAPEUTIC EXERCISES: CPT

## 2019-01-17 NOTE — PROGRESS NOTES
PT DISCHARGE DAILY NOTE AND [id number]    Date:2019  Patient name: Issac Ibarra Start of Care: 19   Referral source: Musa Vann : 1960   Medical/Treatment Diagnosis: Left knee pain [M25.562] Onset Date:2018 DOS: 18        Prior Hospitalization: see medical history Provider#: [id number]   Medications: Verified on Patient Summary List    Comorbidities: arthritis, BMI >30, DM, high blood pressure  Prior Level of Function:(I) with all ADLs and recreational activities    Visits from Start of Care: 6    Missed Visits: 0    Reporting Period : 18 to 19    Date:2019  : 1960  [x]  Patient  Verified  Payor: Jesi Melton / Plan: Nanosysrembertomenia Caity / Product Type: HMO /    In time:3:30  Out time:4:14  Total Treatment Time (min): 44  Visit #: 6 of 8    Medicare/BCBS Only   Total Timed Codes (min):  34 1:1 Treatment Time:  26       SUBJECTIVE  Pain Level (0-10 scale): 0  Any medication changes, allergies to medications, adverse drug reactions, diagnosis change, or new procedure performed?: [x] No    [] Yes (see summary sheet for update)  Subjective functional status/changes:   [] No changes reported  \"I feel great\". OBJECTIVE    Modality rationale: decrease inflammation and decrease pain to improve the patients ability to perform ADLs with improved ease.     Min Type Additional Details    [] Estim:  []Unatt       []IFC  []Premod                        []Other:  []w/ice   []w/heat  Position:  Location:    [] Estim: []Att    []TENS instruct  []NMES                    []Other:  []w/US   []w/ice   []w/heat  Position:  Location:    []  Traction: [] Cervical       []Lumbar                       [] Prone          []Supine                       []Intermittent   []Continuous Lbs:  [] before manual  [] after manual    []  Ultrasound: []Continuous   [] Pulsed                           []1MHz   []3MHz W/cm2:  Location:    []  Iontophoresis with dexamethasone Location: [] Take home patch   [] In clinic    []  Ice     []  heat  []  Ice massage  []  Laser   []  Anodyne Position:  Location:    []  Laser with stim  []  Other:  Position:  Location:   10 [x]  Vasopneumatic Device Pressure:       [x] lo [] med [] hi   Temperature: [x] lo [] med [] hi   [] Skin assessment post-treatment:  []intact []redness- no adverse reaction    []redness - adverse reaction:       16 min Therapeutic Exercise:  [x] See flow sheet :   Rationale: increase ROM and increase strength to improve the patients ability to perform ADLs with improved ease. 10 min Neuromuscular Re-education:  [x]  See flow sheet :   Rationale: improve coordination, improve balance and increase proprioception  to improve the patients ability to perform functional activities with improve left knee stability. With   [x] TE   [] TA   [] neuro   [] other: Patient Education: [x] Review HEP    [] Progressed/Changed HEP based on:   [x] positioning   [] body mechanics   [] transfers   [] heat/ice application    [] other:      Other Objective/Functional Measures: Patient progressed to green theraband for hip 3 way today. Pain Level (0-10 scale) post treatment: 0    Summary of Care: Patient has demonstrated great improvements in left knee strength and stability. Patient demonstrates all functional activities and ADLs without difficulty and without increase in pain. Patient performs 3 reps of stairs today with out difficulty. Patient has demonstrated improvements in squat mechanics. Goal: Patient will be able to improve FOTO score to 78 in order to demonstrate improvements in functional independence. Status at last note/certification: not met  Status at discharge: met    Goal:Patient will be able to improve left knee AROM to 0-120 in order to improve ease of ADLs.    Status at last note/certification: goal met   Status at discharge: met    Goal:Patient will be able to perform a squat with proper mechanics without difficulty in order to return to PLOF. Status at last note/certification: Goal  Met   Status at discharge: met    Goal: Patient will be able to go up and down a flight of stairs without difficulty in order to improve functional mobility. Status at last note/certification: Goal met   Status at discharge: met    ASSESSMENT/Changes in Function: Patient has achieved all established therapy goals and demonstrates no difficulty with functional mobility. Patient has been challenged during therapy session and puts forth great effort each session. Patient has achived full left knee ROM and demonstrates symmetrical strength to RLE. Patient provided with extensive HEP and educated on importance of continued exercise in order to maintain progress.      Thank you for this referral!      PLAN  [x]Discontinue therapy: [x]Patient has reached or is progressing toward set goals      []Patient is non-compliant or has abdicated      []Due to lack of appreciable progress towards set goals    Amor Saavedra, PT 1/17/2019  3:49 PM

## 2019-01-22 ENCOUNTER — APPOINTMENT (OUTPATIENT)
Dept: PHYSICAL THERAPY | Age: 59
End: 2019-01-22
Payer: COMMERCIAL

## 2019-01-24 ENCOUNTER — APPOINTMENT (OUTPATIENT)
Dept: PHYSICAL THERAPY | Age: 59
End: 2019-01-24
Payer: COMMERCIAL

## 2019-02-06 ENCOUNTER — OFFICE VISIT (OUTPATIENT)
Dept: ORTHOPEDIC SURGERY | Age: 59
End: 2019-02-06

## 2019-02-06 VITALS
WEIGHT: 194.8 LBS | SYSTOLIC BLOOD PRESSURE: 127 MMHG | HEART RATE: 86 BPM | OXYGEN SATURATION: 98 % | BODY MASS INDEX: 36.78 KG/M2 | DIASTOLIC BLOOD PRESSURE: 71 MMHG | TEMPERATURE: 96.5 F | HEIGHT: 61 IN | RESPIRATION RATE: 10 BRPM

## 2019-02-06 DIAGNOSIS — M17.12 PRIMARY OSTEOARTHRITIS OF LEFT KNEE: ICD-10-CM

## 2019-02-06 DIAGNOSIS — S83.207D ACUTE MENISCAL TEAR OF LEFT KNEE, SUBSEQUENT ENCOUNTER: Primary | ICD-10-CM

## 2019-02-06 RX ORDER — DICLOFENAC SODIUM 10 MG/G
4 GEL TOPICAL 4 TIMES DAILY
Qty: 100 G | Refills: 5 | Status: SHIPPED | OUTPATIENT
Start: 2019-02-06

## 2019-02-06 RX ORDER — ROSUVASTATIN CALCIUM 40 MG/1
40 TABLET, COATED ORAL
COMMUNITY

## 2019-02-06 NOTE — PROGRESS NOTES
Patient: Kirsten Cui  YOB: 1960       HISTORY:  The patient presents for reevaluation of her left knee status post arthroscopic partial medial and lateral menisectomy with degenerative arthritis on 12/14/18. Patient is improved, states pain is a 4 out of 10.  she has gone to physical therapy and continues to do her exercises on her own. She notes persistent problems with activities of daily living. She notes swelling. Dull aching pain worse with activities. Patient denies any fever, chills, chest pain, shortness of breath or calf pain. The remainder of the review of systems is negative. There are no new illness or injuries to report since last seen in the office. No changes in medications, allergies, social or family history. PHYSICAL EXAMINATION:    Visit Vitals  /71   Pulse 86   Temp 96.5 °F (35.8 °C) (Oral)   Resp 10   Ht 5' 1\" (1.549 m)   Wt 194 lb 12.8 oz (88.4 kg)   SpO2 98%   BMI 36.81 kg/m²     The patient is a well-developed, well-nourished female in no acute distress. The patient is alert and oriented times three. The patient appears to be well groomed. Mood and affect are normal.   ORTHOPEDIC EXAM of Left knee: Inspection: Effusion not present,  incisions well healed  TTP: medial joint line  Range of motion: 0-120 flexion  Stability: Stable  Strength: 5/5  2+ distal pulses    IMPRESSION:  Status post Left knee arthroscopic partial medial and lateral menisectomy with degen arthritis with joint space narrowing. PLAN:   1. Patient still with some discomfort post operatively  2. Will authorize euflexxa injections given degen arthritis with joint space narrowing  3.  rx for voltaren gel given today for pain    RTC after [employer] is authorized    Estrella San PA-C  [facility]

## 2019-02-06 NOTE — PATIENT INSTRUCTIONS
Patient: Edgard Reveles  YOB: 1960       HISTORY:  The patient presents for reevaluation of her left knee status post arthroscopic partial medial and lateral menisectomy with degenerative arthritis on 12/14/18. Patient is improved, states pain is a 2 out of 10.  she has gone to physical therapy and is very happy with her progress. She notes no limitations with her ADLs. Patient denies any fever, chills, chest pain, shortness of breath or calf pain. The remainder of the review of systems is negative. There are no new illness or injuries to report since last seen in the office. No changes in medications, allergies, social or family history. PHYSICAL EXAMINATION:    Visit Vitals  /71   Pulse 86   Temp 96.5 °F (35.8 °C) (Oral)   Resp 10   Ht 5' 1\" (1.549 m)   Wt 194 lb 12.8 oz (88.4 kg)   SpO2 98%   BMI 36.81 kg/m²     The patient is a well-developed, well-nourished female in no acute distress. The patient is alert and oriented times three. The patient appears to be well groomed. Mood and affect are normal.   ORTHOPEDIC EXAM of Left knee: Inspection: Effusion not present,  incisions well healed  TTP: medial joint line  Range of motion: 0-120 flexion  Stability: Stable  Strength: 5/5  2+ distal pulses    IMPRESSION:  Status post Left knee arthroscopic partial medial and lateral menisectomy with degen arthritis with joint space narrowing. PLAN:   1. Patient doing well post operatively  2. Cont with gradually increasing her activities as tolerated stressing no increases in pain  3.  Cont with PT transitioning to HEP when ready    RTC PRN    [facility]

## 2019-02-06 NOTE — PROGRESS NOTES
1. Have you been to the ER, urgent care clinic since your last visit? Hospitalized since your last visit? No    2. Have you seen or consulted any other health care providers outside of the [address] [place] [address] since your last visit? Include any pap smears or colon screening.  No

## 2019-06-17 ENCOUNTER — HOSPITAL ENCOUNTER (OUTPATIENT)
Dept: MAMMOGRAPHY | Age: 59
Discharge: HOME | End: 2019-06-17
Attending: FAMILY MEDICINE
Payer: COMMERCIAL

## 2019-06-17 DIAGNOSIS — Z12.31 VISIT FOR SCREENING MAMMOGRAM: ICD-10-CM

## 2019-06-17 PROCEDURE — 77063 BREAST TOMOSYNTHESIS BI: CPT

## 2020-10-13 ENCOUNTER — TRANSCRIBE ORDER (OUTPATIENT)
Dept: SCHEDULING | Age: 60
End: 2020-10-13

## 2020-10-13 DIAGNOSIS — Z12.31 VISIT FOR SCREENING MAMMOGRAM: Primary | ICD-10-CM

## 2020-12-15 ENCOUNTER — HOSPITAL ENCOUNTER (OUTPATIENT)
Dept: MAMMOGRAPHY | Age: 60
Discharge: HOME | End: 2020-12-15
Attending: FAMILY MEDICINE
Payer: COMMERCIAL

## 2020-12-15 DIAGNOSIS — Z12.31 VISIT FOR SCREENING MAMMOGRAM: ICD-10-CM

## 2020-12-15 PROCEDURE — 77063 BREAST TOMOSYNTHESIS BI: CPT

## 2022-01-03 ENCOUNTER — TRANSCRIBE ORDER (OUTPATIENT)
Dept: SCHEDULING | Age: 62
End: 2022-01-03

## 2022-01-03 DIAGNOSIS — Z12.31 VISIT FOR SCREENING MAMMOGRAM: Primary | ICD-10-CM

## 2022-01-17 ENCOUNTER — HOSPITAL ENCOUNTER (OUTPATIENT)
Dept: MAMMOGRAPHY | Age: 62
Discharge: HOME | End: 2022-01-17
Attending: FAMILY MEDICINE
Payer: COMMERCIAL

## 2022-01-17 DIAGNOSIS — Z12.31 VISIT FOR SCREENING MAMMOGRAM: ICD-10-CM

## 2022-01-17 PROCEDURE — 77063 BREAST TOMOSYNTHESIS BI: CPT

## 2022-03-18 PROBLEM — E66.01 SEVERE OBESITY (HCC): Status: ACTIVE | Noted: 2018-10-23

## 2022-05-17 ENCOUNTER — HOSPITAL ENCOUNTER (OUTPATIENT)
Dept: PHYSICAL THERAPY | Age: 62
Discharge: HOME | End: 2022-05-17
Payer: COMMERCIAL

## 2022-05-17 PROCEDURE — 97530 THERAPEUTIC ACTIVITIES: CPT

## 2022-05-17 PROCEDURE — 97110 THERAPEUTIC EXERCISES: CPT

## 2022-05-17 PROCEDURE — 97162 PT EVAL MOD COMPLEX 30 MIN: CPT
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
## 2022-05-19 ENCOUNTER — HOSPITAL ENCOUNTER (OUTPATIENT)
Dept: PHYSICAL THERAPY | Age: 62
Discharge: HOME | End: 2022-05-19
Payer: COMMERCIAL

## 2022-05-19 PROCEDURE — 97110 THERAPEUTIC EXERCISES: CPT

## 2022-05-19 PROCEDURE — 97140 MANUAL THERAPY 1/> REGIONS: CPT

## 2022-05-19 NOTE — PROGRESS NOTES
PT DAILY TREATMENT NOTE     Patient Name: Kaiser Merlos  Date:2022  : 1960  [x]  Patient  Verified  Payor: Kaz Stuart / Plan: Abi Copas / Product Type: HMO /    In time:217 pm  Out time:311 pm  Total Treatment Time (min): 47  Visit #: 2 of 12    Medicare/BCBS Only   Total Timed Codes (min):  44 1:1 Treatment Time:  44       Treatment Area: Neck pain [M54.2]    SUBJECTIVE  Pain Level (0-10 scale): 6/10   Any medication changes, allergies to medications, adverse drug reactions, diagnosis change, or new procedure performed?: [x] No    [] Yes (see summary sheet for update)  Subjective functional status/changes:   [] No changes reported  Patient states that her neck is really sore today. OBJECTIVE    Modality rationale: decrease inflammation, decrease pain and increase tissue extensibility to improve the patients ability to assist with performing ADL's and functional tasks without limitations.    Min Type Additional Details    [] Estim:  []Unatt       []IFC  []Premod                        []Other:  []w/ice   []w/heat  Position:  Location:    [] Estim: []Att    []TENS instruct  []NMES                    []Other:  []w/US   []w/ice   []w/heat  Position:  Location:    []  Traction: [] Cervical       []Lumbar                       [] Prone          []Supine                       []Intermittent   []Continuous Lbs:  [] before manual  [] after manual    []  Ultrasound: []Continuous   [] Pulsed                           []1MHz   []3MHz W/cm2:  Location:    []  Iontophoresis with dexamethasone         Location: [] Take home patch   [] In clinic   10 []  Ice     [x]  heat  []  Ice massage  []  Laser   []  Anodyne Position:supine   Location:C/S    []  Laser with stim  []  Other:  Position:  Location:    []  Vasopneumatic Device    []  Right     []  Left  Pre-treatment girth:  Post-treatment girth:  Measured at (location):  Pressure:       [] lo [] med [] hi   Temperature: [] lo [] med [] hi   [] Skin assessment post-treatment:  []intact []redness- no adverse reaction  []redness - adverse reaction:     34 min Therapeutic Exercise:  [x] See flow sheet :   Rationale: increase ROM and increase strength to improve the patients overall muscle endurance and activity tolerance for ADL's and functional tasks. min Therapeutic Activity:  [x]  See flow sheet :   Rationale: increase strength and improve coordination  to improve the patients ability to safely perform dynamic activities and to improve functional performance of ADL's.      min Neuromuscular Re-education:  [x]  See flow sheet :   Rationale: increase strength, improve coordination and improve balance  to improve the patients ability to perform activities with good form, stability and proprioception. 10  min Manual Therapy:  STM to cervical musculature; manual cervical traction   The manual therapy interventions were performed at a separate and distinct time from the therapeutic activities interventions. Rationale: decrease pain, increase ROM, increase tissue extensibility, decrease trigger points and increase postural awareness to assist with performing ADL's with ease. With   [] TE   [] TA   [] neuro   [] other: Patient Education: [x] Review HEP    [] Progressed/Changed HEP based on:   [] positioning   [] body mechanics   [] transfers   [] heat/ice application    [] other:      Other Objective/Functional Measures:    Initiated exercises set a initial evaluation. Verbal cues required for proper form. TTP at (B) upper trap and cervical paraspinals    Pain Level (0-10 scale) post treatment: 5/10     ASSESSMENT/Changes in Function:   Patient is progressing as expected towards goals. Patient performed exercises, as per flow sheet, to assist with increasing postural strength and cervical ROM. Patient experienced a slight increase in upper back pain with today's exercises.  Patient was instructed to continue with performing stretches at home. Patient verbalized understanding. Patient responded well to today's session, as evident by, a slight decrease in cervical pain and improved activity tolerance. Patient will continue to benefit from skilled PT services to modify and progress therapeutic interventions, address functional mobility deficits, address ROM deficits, address strength deficits, analyze and address soft tissue restrictions, analyze and cue movement patterns, analyze and modify body mechanics/ergonomics, assess and modify postural abnormalities and instruct in home and community integration to attain remaining goals. []  See Plan of Care  []  See progress note/recertification  []  See Discharge Summary         Progress towards goals / Updated goals:  Short Term Goals: To be accomplished in 2 treatments:   1. Patient will become proficient in their HEP and will be compliant in performing that program.   Evaluation: HEP established.     Long Term Goals: To be accomplished in 4 weeks:   1. Patient's pain level will be 0-3/10 with activity in order to improve patient's ability to perform normal ADLs.    Evaluation: 0-8/10 with activity    2. Patient will increase FOTO score to >/= 72 points to indicate increased functional mobility.   Evaluation: 46 points   3. Patient will demonstrate pain free cervical left rot and right SB AROM WNL in order to increase ease and safety with driving  Evaluation: Right SB: 15 deg, Left Rot: 25 deg; pain  4.  Patient will report >/= 75% improvement in HA ss/sx in order to increase efficency with IADL performance and demonstrate a return to PLOF  Evaluation: 0%     PLAN  [x]  Upgrade activities as tolerated     [x]  Continue plan of care  []  Update interventions per flow sheet       []  Discharge due to:_  []  Other:_      Lasha Bass PTA 5/19/2022  3:04 PM    Future Appointments   Date Time Provider Ha Whitten   5/24/2022  9:45 AM Donna Bowman [facility]   5/26/2022  3:45 PM Mindy Olmstead Perla Augustin [facility]   5/31/2022 12:00 PM Maximo Rajani [facility]   6/1/2022  4:30 PM Gildardo Albrecht DPT [facility]   6/2/2022  6:00 PM Tanna Thomason DPT [facility]

## 2022-05-24 ENCOUNTER — HOSPITAL ENCOUNTER (OUTPATIENT)
Dept: PHYSICAL THERAPY | Age: 62
Discharge: HOME | End: 2022-05-24
Payer: COMMERCIAL

## 2022-05-24 PROCEDURE — 97112 NEUROMUSCULAR REEDUCATION: CPT

## 2022-05-24 PROCEDURE — 97110 THERAPEUTIC EXERCISES: CPT

## 2022-05-24 NOTE — PROGRESS NOTES
PT DAILY TREATMENT NOTE     Patient Name: aMyco Person  Date:2022  : 1960  [x]  Patient  Verified  Payor: Tuan Dillard / Plan: Woodrow Caballero / Product Type: HMO /    In time:9:46  Out time:10:41  Total Treatment Time (min): 55  Visit #: 3 of 12    Medicare/BCBS Only   Total Timed Codes (min):  45 1:1 Treatment Time:  41       Treatment Area: Neck pain [M54.2]    SUBJECTIVE  Pain Level (0-10 scale): 5  Any medication changes, allergies to medications, adverse drug reactions, diagnosis change, or new procedure performed?: [x] No    [] Yes (see summary sheet for update)  Subjective functional status/changes:   [] No changes reported  Pt reports being so sore after last visit and felt like she had a headache    OBJECTIVE    Modality rationale: decrease pain to improve the patients ability to perform daily tasks   Min Type Additional Details    [] Estim:  []Unatt       []IFC  []Premod                        []Other:  []w/ice   []w/heat  Position:  Location:    [] Estim: []Att    []TENS instruct  []NMES                    []Other:  []w/US   []w/ice   []w/heat  Position:  Location:    []  Traction: [] Cervical       []Lumbar                       [] Prone          []Supine                       []Intermittent   []Continuous Lbs:  [] before manual  [] after manual    []  Ultrasound: []Continuous   [] Pulsed                           []1MHz   []3MHz W/cm2:  Location:    []  Iontophoresis with dexamethasone         Location: [] Take home patch   [] In clinic   10 []  Ice     [x]  heat  []  Ice massage  []  Laser   []  Anodyne Position: supine with wedge  Location: neck and back    []  Laser with stim  []  Other:  Position:  Location:    []  Vasopneumatic Device    []  Right     []  Left  Pre-treatment girth:  Post-treatment girth:  Measured at (location):  Pressure:       [] lo [] med [] hi   Temperature: [] lo [] med [] hi   [] Skin assessment post-treatment:  []intact []redness- no adverse reaction    []redness - adverse reaction:     35 min Therapeutic Exercise:  [x] See flow sheet :   Rationale: increase ROM and increase strength to improve the patients ability to perform ADLs    10 min Manual Therapy: In supine, gentle manual c/s traction, SOR, STM to mary UT, lev scap and c/s  Paraspinals, gentle cervical PA mobs. The manual therapy interventions were performed at a separate and distinct time from the therapeutic activities interventions. Rationale: decrease pain, increase ROM and increase tissue extensibility to improve functional mobility             With   [] TE   [] TA   [] neuro   [] other: Patient Education: [x] Review HEP    [] Progressed/Changed HEP based on:   [] positioning   [] body mechanics   [] transfers   [] heat/ice application    [] other:      Other Objective/Functional Measures:      Pain Level (0-10 scale) post treatment: 4-5    ASSESSMENT/Changes in Function: Vc's to avoid incr'd pain with stretches in order to decr ms soreness following therex. Pt is very guarded but willing to try each exercise. Cont'd ms tension and TTP @ mary UT, lev scap, and c/s paraspinals. Also some cervical hypomobility noted. Min decr'd pain following treatment. Patient will continue to benefit from skilled PT services to modify and progress therapeutic interventions, address functional mobility deficits, address ROM deficits, address strength deficits, analyze and address soft tissue restrictions, analyze and cue movement patterns, analyze and modify body mechanics/ergonomics and assess and modify postural abnormalities to attain remaining goals. []  See Plan of Care  []  See progress note/recertification  []  See Discharge Summary         Progress towards goals / Updated goals:  Short Term Goals: To be accomplished in 2 treatments:   1. Patient will become proficient in their HEP and will be compliant in performing that program.   Evaluation: HEP established.   Current: Progressing, pt reports compliance 5/24/2022   Long Term Goals: To be accomplished in 4 weeks:   1. Patient's pain level will be 0-3/10 with activity in order to improve patient's ability to perform normal ADLs.    Evaluation: 0-8/10 with activity    2. Patient will increase FOTO score to >/= 72 points to indicate increased functional mobility.   Evaluation: 46 points   3. Patient will demonstrate pain free cervical left rot and right SB AROM WNL in order to increase ease and safety with driving  Evaluation: Right SB: 15 deg, Left Rot: 25 deg; pain  4.  Patient will report >/= 75% improvement in HA ss/sx in order to increase efficency with IADL performance and demonstrate a return to PLOF  Evaluation: 0%    PLAN  []  Upgrade activities as tolerated     [x]  Continue plan of care  []  Update interventions per flow sheet       []  Discharge due to:_  []  Other:_      Andres Marquis PTA 5/24/2022  10:01 AM    Future Appointments   Date Time Provider Ha Whitten   5/26/2022  3:45 PM Abdoulaye Shepherd PTA [facility]   5/31/2022 12:00 PM Kaleb Burnett [facility]   6/1/2022  4:30 PM Clyde Song DPT [facility]   6/2/2022  6:00 PM Susan Thomason DPT [facility]

## 2022-05-26 ENCOUNTER — HOSPITAL ENCOUNTER (OUTPATIENT)
Dept: PHYSICAL THERAPY | Age: 62
Discharge: HOME | End: 2022-05-26
Payer: COMMERCIAL

## 2022-05-26 PROCEDURE — 97140 MANUAL THERAPY 1/> REGIONS: CPT

## 2022-05-26 PROCEDURE — 97110 THERAPEUTIC EXERCISES: CPT

## 2022-05-26 PROCEDURE — 97014 ELECTRIC STIMULATION THERAPY: CPT

## 2022-05-26 NOTE — PROGRESS NOTES
PT DAILY TREATMENT NOTE     Patient Name: Janet Bowman  Date:2022  : 1960  [x]  Patient  Verified  Payor: Kj Calabrese / Plan: Kami Krishna / Product Type: HMO /    In time: 347 pm  Out time: 448 pm  Total Treatment Time (min): 61  Visit #: 4 of 12    Medicare/BCBS Only   Total Timed Codes (min): 61 1:1 Treatment Time:  51       Treatment Area: Neck pain [M54.2]    SUBJECTIVE  Pain Level (0-10 scale): 6/10   Any medication changes, allergies to medications, adverse drug reactions, diagnosis change, or new procedure performed?: [x] No    [] Yes (see summary sheet for update)  Subjective functional status/changes:   [] No changes reported  Patient states that her back is sore. \"Everything I do in here wakes up my pain. \"     OBJECTIVE    Modality rationale: decrease inflammation, decrease pain and increase tissue extensibility to improve the patients ability to assist with performing ADL's and functional tasks without limitations.    Min Type Additional Details   10 [] Estim:  [x]Unatt       [x]IFC  []Premod                        []Other:  []w/ice   [x]w/heat  Position:supine  Location:C/S    [] Estim: []Att    []TENS instruct  []NMES                    []Other:  []w/US   []w/ice   []w/heat  Position:  Location:    []  Traction: [] Cervical       []Lumbar                       [] Prone          []Supine                       []Intermittent   []Continuous Lbs:  [] before manual  [] after manual    []  Ultrasound: []Continuous   [] Pulsed                           []1MHz   []3MHz W/cm2:  Location:    []  Iontophoresis with dexamethasone         Location: [] Take home patch   [] In clinic    []  Ice     []  heat  []  Ice massage  []  Laser   []  Anodyne Position:  Location:    []  Laser with stim  []  Other:  Position:  Location:    []  Vasopneumatic Device    []  Right     []  Left  Pre-treatment girth:  Post-treatment girth:  Measured at (location):  Pressure:       [] lo [] med [] hi Temperature: [] lo [] med [] hi   [] Skin assessment post-treatment:  []intact []redness- no adverse reaction  []redness - adverse reaction:     36 min Therapeutic Exercise:  [x] See flow sheet :   Rationale: increase ROM and increase strength to improve the patients overall muscle endurance and activity tolerance for ADL's and functional tasks. min Therapeutic Activity:  [x]  See flow sheet :   Rationale: increase strength and improve coordination  to improve the patients ability to safely perform dynamic activities and to improve functional performance of ADL's.      min Neuromuscular Re-education:  [x]  See flow sheet :   Rationale: increase strength, improve coordination and improve balance  to improve the patients ability to perform activities with good form, stability and proprioception. 15  min Manual Therapy:  STM to cervical musculature; manual cervical traction   The manual therapy interventions were performed at a separate and distinct time from the therapeutic activities interventions. Rationale: decrease pain, increase ROM, increase tissue extensibility, decrease trigger points and increase postural awareness to assist with performing ADL's with ease. With   [] TE   [] TA   [] neuro   [] other: Patient Education: [x] Review HEP    [] Progressed/Changed HEP based on:   [] positioning   [] body mechanics   [] transfers   [] heat/ice application    [] other:      Other Objective/Functional Measures: Added (B) shoulder ext with YTB, flexion wall slides     TTP at (B) upper trap and cervical paraspinals    Pain Level (0-10 scale) post treatment: 5/10     ASSESSMENT/Changes in Function:   Patient is progressing as expected towards goals. Progressed exercises, as per flow sheet, to assist with increasing postural strength and cervical mobility. Verbal cues required for today's exercises. Muscle guarding still present with exercises.  Initiate electrical stimulation to assist with decreasing cervical spasms, pain and tenderness. Patient responded well to today's session, as evident by, a slight decrease in cervical pain and improved exercise tolerance. Patient will continue to benefit from skilled PT services to modify and progress therapeutic interventions, address functional mobility deficits, address ROM deficits, address strength deficits, analyze and address soft tissue restrictions, analyze and cue movement patterns, analyze and modify body mechanics/ergonomics, assess and modify postural abnormalities and instruct in home and community integration to attain remaining goals. []  See Plan of Care  []  See progress note/recertification  []  See Discharge Summary         Progress towards goals / Updated goals:  Short Term Goals: To be accomplished in 2 treatments:   1. Patient will become proficient in their HEP and will be compliant in performing that program.   Evaluation: HEP established. Current: Patient reports doing HEP throughout the day. (5/26/2022)     Long Term Goals: To be accomplished in 4 weeks:   1. Patient's pain level will be 0-3/10 with activity in order to improve patient's ability to perform normal ADLs.    Evaluation: 0-8/10 with activity    Current: Patient reports 6/10 pain today. (5/26/2022)    2. Patient will increase FOTO score to >/= 72 points to indicate increased functional mobility.   Evaluation: 46 points   Current: Progressing, will assess at next progress note. (5/26/2022)     3. Patient will demonstrate pain free cervical left rot and right SB AROM WNL in order to increase ease and safety with driving  Evaluation: Right SB: 15 deg, Left Rot: 25 deg; pain  Current: Progressing (5/26/2022)     4. Patient will report >/= 75% improvement in HA ss/sx in order to increase efficency with IADL performance and demonstrate a return to PLOF  Evaluation: 0%  Current: Progressing, will assess at next progress note.  (5/26/2022)     PLAN  [x]  Upgrade activities as tolerated     [x]  Continue plan of care  []  Update interventions per flow sheet       []  Discharge due to:_  []  Other:_      Sheryl Rice PTA 5/26/2022  3:04 PM    Future Appointments   Date Time Provider Ha Whitten   5/31/2022 12:00 PM Marquise Kirkland [facility]   6/1/2022  4:30 PM Go Lewis PTA [facility]   6/2/2022  6:00 PM Albert Thomason, DPT [facility]

## 2022-05-31 ENCOUNTER — HOSPITAL ENCOUNTER (OUTPATIENT)
Dept: PHYSICAL THERAPY | Age: 62
Discharge: HOME | End: 2022-05-31
Payer: COMMERCIAL

## 2022-05-31 PROCEDURE — 97110 THERAPEUTIC EXERCISES: CPT

## 2022-05-31 PROCEDURE — 97140 MANUAL THERAPY 1/> REGIONS: CPT

## 2022-05-31 NOTE — PROGRESS NOTES
PT DAILY TREATMENT NOTE     Patient Name: Rita Benjamin  Date:2022  : 1960  [x]  Patient  Verified  Payor: José Miguel Roche / Plan: Rustam Arriaza / Product Type: HMO /    In time:12:03pm  Out time:12:55pm  Total Treatment Time (min): 52  Visit #: 5 of 12    Medicare/BCBS Only   Total Timed Codes (min):  42 1:1 Treatment Time:  42       Treatment Area: Neck pain [M54.2]    SUBJECTIVE  Pain Level (0-10 scale): back 10/10; neck 5/10  Any medication changes, allergies to medications, adverse drug reactions, diagnosis change, or new procedure performed?: [x] No    [] Yes (see summary sheet for update)  Subjective functional status/changes:   [] No changes reported  \"I am feeling a lot more pain today. My back is hurting. \"    OBJECTIVE    Modality rationale: decrease inflammation, decrease pain, increase tissue extensibility and increase muscle contraction/control to improve the patients ability to perform daily activities   Min Type Additional Details   10 [x] Estim:  [x]Unatt       [x]IFC  []Premod                        []Other:  []w/ice   [x]w/heat  Position: supine  Location: cervical spine    [] Estim: []Att    []TENS instruct  []NMES                    []Other:  []w/US   []w/ice   []w/heat  Position:  Location:    []  Traction: [] Cervical       []Lumbar                       [] Prone          []Supine                       []Intermittent   []Continuous Lbs:  [] before manual  [] after manual    []  Ultrasound: []Continuous   [] Pulsed                           []1MHz   []3MHz W/cm2:  Location:    []  Iontophoresis with dexamethasone         Location: [] Take home patch   [] In clinic    []  Ice     [x]  heat  []  Ice massage  []  Laser   []  Anodyne Position:  Location:    []  Laser with stim  []  Other:  Position:  Location:    []  Vasopneumatic Device    []  Right     []  Left  Pre-treatment girth:  Post-treatment girth:  Measured at (location):  Pressure:       [] lo [] med [] hi Temperature: [] lo [] med [] hi   [] Skin assessment post-treatment:  [x]intact [x]redness- no adverse reaction    []redness - adverse reaction:     25 min Therapeutic Exercise:  [] See flow sheet :   Rationale: increase ROM and increase strength to improve the patients ability to improve ROM and ease of performing daily activities    17 min Manual Therapy: In supine, STM, gentle manual cervical spine traction   The manual therapy interventions were performed at a separate and distinct time from the therapeutic activities interventions. Rationale: decrease pain, increase ROM, increase tissue extensibility, decrease edema , decrease trigger points and increase postural awareness to improve ease of performing daily activities        With   [x] TE   [] TA   [] neuro   [] other: Patient Education: [x] Review HEP    [] Progressed/Changed HEP based on:   [] positioning   [] body mechanics   [] transfers   [] heat/ice application    [] other:      Other Objective/Functional Measures:      Pain Level (0-10 scale) post treatment: 5    ASSESSMENT/Changes in Function: Today's session focused on interventions to increase UE ROM and manual therapy to decrease strain on cervical spine. Instructed in sidelying open books with patient reporting increased stretching and decreased pain with intervention. Patient will continue to benefit from skilled PT services to modify and progress therapeutic interventions, address functional mobility deficits, address ROM deficits, address strength deficits, analyze and address soft tissue restrictions, analyze and cue movement patterns, analyze and modify body mechanics/ergonomics, assess and modify postural abnormalities and instruct in home and community integration to attain remaining goals. []  See Plan of Care  []  See progress note/recertification  []  See Discharge Summary         Progress towards goals / Updated goals:  Short Term Goals:  To be accomplished in 2 treatments:   1. Patient will become proficient in their HEP and will be compliant in performing that program.   Evaluation: HEP established. Current: Patient reports doing HEP throughout the day. (5/26/2022)      Long Term Goals: To be accomplished in 4 weeks:   1. Patient's pain level will be 0-3/10 with activity in order to improve patient's ability to perform normal ADLs.    Evaluation: 0-8/10 with activity    Current: Patient reports 6/10 pain today. (5/26/2022)     2. Patient will increase FOTO score to >/= 72 points to indicate increased functional mobility.   Evaluation: 46 points   Current: Progressing, will assess at next progress note. (5/26/2022)     3. Patient will demonstrate pain free cervical left rot and right SB AROM WNL in order to increase ease and safety with driving  Evaluation: Right SB: 15 deg, Left Rot: 25 deg; pain  Current: Progressing (5/26/2022)     4. Patient will report >/= 75% improvement in HA ss/sx in order to increase efficency with IADL performance and demonstrate a return to PLOF  Evaluation: 0%  Current: Progressing, will assess at next progress note.  (5/26/2022)         PLAN  [x]  Upgrade activities as tolerated     [x]  Continue plan of care  [x]  Update interventions per flow sheet       []  Discharge due to:_  []  Other:_      Asha Garcia, PT 5/31/2022  12:09 PM    Future Appointments   Date Time Provider Ha Whitten   6/1/2022  4:30 PM Dala Huger, PTA [facility]   6/2/2022  6:00 PM [place] Leaver, DPT [facility]   6/6/2022  3:45 PM [place] Leaver, DPT [facility]   6/8/2022  3:00 PM Dala Huger, PTA [facility]   6/10/2022  3:45 PM Dala Huger, PTA [facility]   6/13/2022  4:30 PM Dala Huger, PTA [facility]   6/15/2022  4:30 PM Dala Huger, PTA [facility]   6/17/2022  4:30 PM Dala Huger, PTA [facility]

## 2022-06-01 ENCOUNTER — HOSPITAL ENCOUNTER (OUTPATIENT)
Dept: PHYSICAL THERAPY | Age: 62
Discharge: HOME | End: 2022-06-01
Payer: COMMERCIAL

## 2022-06-01 PROCEDURE — 97530 THERAPEUTIC ACTIVITIES: CPT

## 2022-06-01 PROCEDURE — 97140 MANUAL THERAPY 1/> REGIONS: CPT

## 2022-06-01 PROCEDURE — 97110 THERAPEUTIC EXERCISES: CPT

## 2022-06-01 NOTE — PROGRESS NOTES
PT DAILY TREATMENT NOTE     Patient Name: Gurjit Cuadra  OWUA:[id number]  : 1960  [x]  Patient  Verified  Payor: Shaniqua Patrick / Plan: Miguel Meek / Product Type: HMO /    In time: 430 pm  Out time: 530  pm  Total Treatment Time (min): 60  Visit #: 6 of 12    Medicare/BCBS Only   Total Timed Codes (min): 50 1:1 Treatment Time:  50       Treatment Area: Neck pain [M54.2]    SUBJECTIVE  Pain Level (0-10 scale): 6/10   Any medication changes, allergies to medications, adverse drug reactions, diagnosis change, or new procedure performed?: [x] No    [] Yes (see summary sheet for update)  Subjective functional status/changes:   [] No changes reported  Patient reports that her mid back is hurting more than her neck today. OBJECTIVE    Modality rationale: decrease inflammation, decrease pain and increase tissue extensibility to improve the patients ability to assist with performing ADL's and functional tasks without limitations.    Min Type Additional Details    [] Estim:  []Unatt       []IFC  []Premod                        []Other:  []w/ice   []w/heat  Position:  Location:    [] Estim: []Att    []TENS instruct  []NMES                    []Other:  []w/US   []w/ice   []w/heat  Position:  Location:    []  Traction: [] Cervical       []Lumbar                       [] Prone          []Supine                       []Intermittent   []Continuous Lbs:  [] before manual  [] after manual    []  Ultrasound: []Continuous   [] Pulsed                           []1MHz   []3MHz W/cm2:  Location:    []  Iontophoresis with dexamethasone         Location: [] Take home patch   [] In clinic   10 []  Ice     [x]  heat  []  Ice massage  []  Laser   []  Anodyne Position:reclined  Location:C/S and T/S     []  Laser with stim  []  Other:  Position:  Location:    []  Vasopneumatic Device    []  Right     []  Left  Pre-treatment girth:  Post-treatment girth:  Measured at (location):  Pressure:       [] lo [] med [] hi Temperature: [] lo [] med [] hi   [] Skin assessment post-treatment:  []intact []redness- no adverse reaction  []redness - adverse reaction:     22 min Therapeutic Exercise:  [x] See flow sheet :   Rationale: increase ROM and increase strength to improve the patients overall muscle endurance and activity tolerance for ADL's and functional tasks. 16 min Therapeutic Activity:  [x]  See flow sheet :   Rationale: increase strength and improve coordination  to improve the patients ability to safely perform dynamic activities and to improve functional performance of ADL's.      min Neuromuscular Re-education:  [x]  See flow sheet :   Rationale: increase strength, improve coordination and improve balance  to improve the patients ability to perform activities with good form, stability and proprioception. 12  min Manual Therapy:  STM/DTM to cervical and thoracic musculature. Patient in the prone position    The manual therapy interventions were performed at a separate and distinct time from the therapeutic activities interventions. Rationale: decrease pain, increase ROM, increase tissue extensibility, decrease trigger points and increase postural awareness to assist with performing ADL's with ease. With   [] TE   [] TA   [] neuro   [] other: Patient Education: [x] Review HEP    [] Progressed/Changed HEP based on:   [] positioning   [] body mechanics   [] transfers   [] heat/ice application    [] other:      Other Objective/Functional Measures:   TTP and muscle spasms present at (B) thoracic and cervical paraspinals    Added pec stretch    Progressed intensity of TB with rows and ext     Pain Level (0-10 scale) post treatment: less than 6/10     ASSESSMENT/Changes in Function:   Patient is progressing as expected towards goals. Progressed exercises, as per flow sheet, to assist with increasing postural strength and cervical mobility. No increase in pain with today's progressed exercises.  Cervical and thoracic muscle spasms decreased with manual therapy. Patient responded well to today's session, as evident by, decreased cervical and thoracic muscle spasms and decreased pain. Patient will continue to benefit from skilled PT services to modify and progress therapeutic interventions, address functional mobility deficits, address ROM deficits, address strength deficits, analyze and address soft tissue restrictions, analyze and cue movement patterns, analyze and modify body mechanics/ergonomics, assess and modify postural abnormalities and instruct in home and community integration to attain remaining goals. []  See Plan of Care  []  See progress note/recertification  []  See Discharge Summary         Progress towards goals / Updated goals:  Short Term Goals: To be accomplished in 2 treatments:   1. Patient will become proficient in their HEP and will be compliant in performing that program.   Evaluation: HEP established. Current: Patient reports doing HEP throughout the day. (6/1/2022)     Long Term Goals: To be accomplished in 4 weeks:   1. Patient's pain level will be 0-3/10 with activity in order to improve patient's ability to perform normal ADLs.    Evaluation: 0-8/10 with activity    Current: Patient reports 6/10 pain today. (6/1/2022)    2. Patient will increase FOTO score to >/= 72 points to indicate increased functional mobility.   Evaluation: 46 points   Current: Progressing, will assess at next progress note. (6/1/2022)     3. Patient will demonstrate pain free cervical left rot and right SB AROM WNL in order to increase ease and safety with driving  Evaluation: Right SB: 15 deg, Left Rot: 25 deg; pain  Current: Progressing (6/1/2022)     4.  Patient will report >/= 75% improvement in HA ss/sx in order to increase efficency with IADL performance and demonstrate a return to PLOF  Evaluation: 0%  Current: Progressing, will assess at next progress note. (6/1/2022)     PLAN  [x]  Upgrade activities as tolerated     [x]  Continue plan of care  []  Update interventions per flow sheet       []  Discharge due to:_  []  Other:_      Cheryle Bannister, PTA 6/1/2022  3:04 PM    Future Appointments   Date Time Provider Ha Whitten   6/1/2022  4:30 PM Brett Anaya PTA [facility]   6/2/2022  6:00 PM Rohini Chua DPT [facility]   6/6/2022  3:45 PM Rohini Chua DPT [facility]   6/8/2022  3:00 PM Brett Anaya PTA [facility]   6/10/2022  3:45 PM Brett Anaya PTA [facility]   6/13/2022  4:30 PM Brett Anaya PTA [facility]   6/15/2022  4:30 PM Brett Anaya PTA [facility]   6/17/2022  4:30 PM Brett Anaya PTA [facility]

## 2022-06-02 ENCOUNTER — HOSPITAL ENCOUNTER (OUTPATIENT)
Dept: PHYSICAL THERAPY | Age: 62
Discharge: HOME | End: 2022-06-02
Payer: COMMERCIAL

## 2022-06-02 PROCEDURE — 97110 THERAPEUTIC EXERCISES: CPT

## 2022-06-02 PROCEDURE — 97140 MANUAL THERAPY 1/> REGIONS: CPT

## 2022-06-03 NOTE — PROGRESS NOTES
PT DAILY TREATMENT NOTE     Patient Name: Jhonny Wells  QTBP:  : 1960  [x]  Patient  Verified  Payor: Tiana Kanner / Plan: Eleuterio Baeza / Product Type: HMO /    In time: 6:00P Out time: 7:06P  Total Treatment Time (min): 66  Visit #: 7 of 12    Medicare/BCBS Only   Total Timed Codes (min): 56 1:1 Treatment Time:  55       Treatment Area: Neck pain [M54.2]    SUBJECTIVE  Pain Level (0-10 scale): 7/10   Any medication changes, allergies to medications, adverse drug reactions, diagnosis change, or new procedure performed?: [x] No    [] Yes (see summary sheet for update)  Subjective functional status/changes:   [] No changes reported  Patient reports last night being first night pt got a good nights rest in awhile (in addition to w/o having to take muscle relaxer). Despite this improvement, pt discouraged why symptoms returned the following morning. OBJECTIVE    Modality rationale: decrease pain and increase tissue extensibility to improve the patients ability to assist with performing ADL's and functional tasks without limitations.    Min Type Additional Details    [] Estim:  []Unatt       []IFC  []Premod                        []Other:  []w/ice   []w/heat  Position:  Location:    [] Estim: []Att    []TENS instruct  []NMES                    []Other:  []w/US   []w/ice   []w/heat  Position:  Location:    []  Traction: [] Cervical       []Lumbar                       [] Prone          []Supine                       []Intermittent   []Continuous Lbs:  [] before manual  [] after manual    []  Ultrasound: []Continuous   [] Pulsed                           []1MHz   []3MHz W/cm2:  Location:    []  Iontophoresis with dexamethasone         Location: [] Take home patch   [] In clinic   10 []  Ice     [x]  heat  []  Ice massage  []  Laser   []  Anodyne Position: supine w/ wedge under B LE  Location:C/S and T/S     []  Laser with stim  []  Other:  Position:  Location:    []  Vasopneumatic Device []  Right     []  Left  Pre-treatment girth:  Post-treatment girth:  Measured at (location):  Pressure:       [] lo [] med [] hi   Temperature: [] lo [] med [] hi   [] Skin assessment post-treatment:  []intact []redness- no adverse reaction  []redness - adverse reaction:     30 min Therapeutic Exercise:  [x] See flow sheet :   Rationale: increase ROM and increase strength to improve the patients overall muscle endurance and activity tolerance for ADL's and functional tasks. 26 min Manual Therapy:  (seated): STM/TPr to B cervical paraspinals, right UT/LS and supraspinatus, B thoracic paraspinals and rhomboids    The manual therapy interventions were performed at a separate and distinct time from the therapeutic activities interventions. Rationale: decrease pain, increase ROM, increase tissue extensibility, decrease trigger points and increase postural awareness to increase tolerance to therex and overall return to PLOF           With   [x] TE   [] TA   [] neuro   [] other: Patient Education: [x] Review HEP    [] Progressed/Changed HEP based on:   [] positioning   [] body mechanics   [] transfers   [] heat/ice application    [x] other: pt ed on relevant anatomy/patho, pain science and expectations and timeline of recovery w/ best strategies to manage and measure progression     Other Objective/Functional Measures:   Continued tissue restrictions in c/s and t/s surrounding musculature  Breathing Assessment: poor utilization of diaphragm with increased cervical accessory muscle overcompensation  Emphasized mobility ex - w/ added rhomboid stretch, OH dowel stretch in supine and diaphragmatic breathing activity        Pain Level (0-10 scale) post treatment: 5     ASSESSMENT/Changes in Function:   Patient w/ positive response to MT techniques listed above evident by report of a reduction on pain levels to follow.  Hesitation and increased time needed for therex performance, however completes w/o presence of symptom flare up. Leaves session with report of a decrease in symptoms. Pt ed(see above) provided. Will continue to progress activity program as able and tolerated. Patient will continue to benefit from skilled PT services to modify and progress therapeutic interventions, address functional mobility deficits, address ROM deficits, address strength deficits, analyze and address soft tissue restrictions, analyze and cue movement patterns, analyze and modify body mechanics/ergonomics, assess and modify postural abnormalities and instruct in home and community integration to attain remaining goals. [x]  See Plan of Care  []  See progress note/recertification  []  See Discharge Summary         Progress towards goals / Updated goals:  Short Term Goals: To be accomplished in 2 treatments:   1. Patient will become proficient in their HEP and will be compliant in performing that program.   Evaluation: HEP established. Current: Patient reports doing HEP throughout the day. (6/1/2022)     Long Term Goals: To be accomplished in 4 weeks:   1. Patient's pain level will be 0-3/10 with activity in order to improve patient's ability to perform normal ADLs.    Evaluation: 0-8/10 with activity    Current: Patient reports 6/10 pain today. (6/1/2022)    2. Patient will increase FOTO score to >/= 72 points to indicate increased functional mobility.   Evaluation: 46 points   Current: Progressing, will assess at next progress note. (6/1/2022)     3. Patient will demonstrate pain free cervical left rot and right SB AROM WNL in order to increase ease and safety with driving  Evaluation: Right SB: 15 deg, Left Rot: 25 deg; pain  Current: Progressing (6/1/2022)     4.  Patient will report >/= 75% improvement in HA ss/sx in order to increase efficency with IADL performance and demonstrate a return to PLOF  Evaluation: 0%  Current: Progressing, will assess at next progress note. (6/1/2022)     PLAN  [x]  Upgrade activities as tolerated     [x] Continue plan of care  [x]  Update interventions per flow sheet       []  Discharge due to:_  []  Other:_      Norman Soto DPT 6/3/2022  3:04 PM    Future Appointments   Date Time Provider Ha Whitten   6/6/2022  3:45 PM Elie Hollingsworth DPT [facility]   6/8/2022  3:00 PM [place] Console, PTA [facility]   6/10/2022  3:45 PM [place] Console, PTA [facility]   6/13/2022  4:30 PM [place] Console, PTA [facility]   6/15/2022  4:30 PM Guillermo Console, PTA [facility]   6/17/2022  4:30 PM Guillermo Console, PTA [facility]

## 2022-06-06 ENCOUNTER — HOSPITAL ENCOUNTER (OUTPATIENT)
Dept: PHYSICAL THERAPY | Age: 62
Discharge: HOME | End: 2022-06-06
Payer: COMMERCIAL

## 2022-06-06 PROCEDURE — 97110 THERAPEUTIC EXERCISES: CPT

## 2022-06-06 PROCEDURE — 97140 MANUAL THERAPY 1/> REGIONS: CPT

## 2022-06-06 NOTE — PROGRESS NOTES
PT DAILY TREATMENT NOTE     Patient Name: Sanford Arguelles  Date:2022  : 1960  [x]  Patient  Verified  Payor: Hemanth Dietrich / Plan: Vandana Shelby / Product Type: HMO /    In time: 3:47P Out time: 5:00P  Total Treatment Time (min): 73  Visit #: 8 of 12    Medicare/BCBS Only   Total Timed Codes (min): 63 1:1 Treatment Time:  53       Treatment Area: Neck pain [M54.2]    SUBJECTIVE  Pain Level (0-10 scale): 4/10   Any medication changes, allergies to medications, adverse drug reactions, diagnosis change, or new procedure performed?: [x] No    [] Yes (see summary sheet for update)  Subjective functional status/changes:   [] No changes reported  Patient reports continued improvements in overall pain intensity and ability to move with greater ease/comfort since beginning PT    OBJECTIVE    Modality rationale: decrease pain and increase tissue extensibility to improve the patients ability to assist with performing ADL's and functional tasks without limitations.    Min Type Additional Details    [] Estim:  []Unatt       []IFC  []Premod                        []Other:  []w/ice   []w/heat  Position:  Location:    [] Estim: []Att    []TENS instruct  []NMES                    []Other:  []w/US   []w/ice   []w/heat  Position:  Location:    []  Traction: [] Cervical       []Lumbar                       [] Prone          []Supine                       []Intermittent   []Continuous Lbs:  [] before manual  [] after manual    []  Ultrasound: []Continuous   [] Pulsed                           []1MHz   []3MHz W/cm2:  Location:    []  Iontophoresis with dexamethasone         Location: [] Take home patch   [] In clinic   10 []  Ice     [x]  heat  []  Ice massage  []  Laser   []  Anodyne Position: supine w/ wedge under B LE  Location:C/S and T/S     []  Laser with stim  []  Other:  Position:  Location:    []  Vasopneumatic Device    []  Right     []  Left  Pre-treatment girth:  Post-treatment girth:  Measured at (location):  Pressure:       [] lo [] med [] hi   Temperature: [] lo [] med [] hi   [] Skin assessment post-treatment:  []intact []redness- no adverse reaction  []redness - adverse reaction:     48 min Therapeutic Exercise:  [x] See flow sheet :   Rationale: increase ROM and increase strength to improve the patients overall muscle endurance and activity tolerance for ADL's and functional tasks. 15 min Manual Therapy:  (seated): STM/TPr to B cervical paraspinals, right UT/LS and supraspinatus, B thoracic paraspinals and rhomboids    The manual therapy interventions were performed at a separate and distinct time from the therapeutic activities interventions. Rationale: decrease pain, increase ROM, increase tissue extensibility, decrease trigger points and increase postural awareness to increase tolerance to therex and overall return to PLOF           With   [x] TE   [] TA   [] neuro   [] other: Patient Education: [x] Review HEP    [] Progressed/Changed HEP based on:   [] positioning   [] body mechanics   [] transfers   [] heat/ice application    [x] other: pt ed on self STM (using theracane), relevant anatomy/patho, pain science and expectations and timeline of recovery w/ best strategies to manage and measure progression     Other Objective/Functional Measures:   Continued tissue restrictions in c/s and t/s surrounding musculature  Progressed ex program per flow sheet - added wall push ups, serratus punch, increased reps/resistance of ex      Pain Level (0-10 scale) post treatment: 3     ASSESSMENT/Changes in Function:   Patient w/ positive response to MT techniques listed above evident by report of a reduction on pain levels to follow, in addition to overall positive response to session and notable less hesitation to movement in comparison to previous tx. Leaves session with report of a decrease in symptoms. Pt ed(see above) provided. Will continue to progress activity program as able and tolerated.     Patient will continue to benefit from skilled PT services to modify and progress therapeutic interventions, address functional mobility deficits, address ROM deficits, address strength deficits, analyze and address soft tissue restrictions, analyze and cue movement patterns, analyze and modify body mechanics/ergonomics, assess and modify postural abnormalities and instruct in home and community integration to attain remaining goals. [x]  See Plan of Care  []  See progress note/recertification  []  See Discharge Summary         Progress towards goals / Updated goals:  Short Term Goals: To be accomplished in 2 treatments:   1. Patient will become proficient in their HEP and will be compliant in performing that program.   Evaluation: HEP established. Current: Patient reports doing HEP throughout the day. (6/6/2022)     Long Term Goals: To be accomplished in 4 weeks:   1. Patient's pain level will be 0-3/10 with activity in order to improve patient's ability to perform normal ADLs.    Evaluation: 0-8/10 with activity    Current: Patient reports 6/10 pain today. (6/1/2022)    2. Patient will increase FOTO score to >/= 72 points to indicate increased functional mobility.   Evaluation: 46 points   Current: Progressing, will assess at next progress note. (6/6/2022)     3. Patient will demonstrate pain free cervical left rot and right SB AROM WNL in order to increase ease and safety with driving  Evaluation: Right SB: 15 deg, Left Rot: 25 deg; pain  Current: Progressing (6/1/2022)     4.  Patient will report >/= 75% improvement in HA ss/sx in order to increase efficency with IADL performance and demonstrate a return to PLOF  Evaluation: 0%  Current: Progressing, will assess at next progress note. (6/6/2022)     PLAN  [x]  Upgrade activities as tolerated     [x]  Continue plan of care  [x]  Update interventions per flow sheet       []  Discharge due to:_  []  Other:_      Charlotte Post, DPT 6/6/2022  3:04 PM    Future Appointments   Date Time Provider Ha Whitten   6/8/2022  3:00 PM Josh Avendaño, [place] [facility]   6/10/2022  3:45 PM Josh Avendaño, [facility]   6/13/2022  4:30 PM Josh Avendaño, PTA [facility]   6/15/2022  4:30 PM Josh Avendaño, PTA [facility]   6/17/2022  4:30 PM Josh Avendaño, PTA [facility]

## 2022-06-08 ENCOUNTER — HOSPITAL ENCOUNTER (OUTPATIENT)
Dept: PHYSICAL THERAPY | Age: 62
Discharge: HOME | End: 2022-06-08
Payer: COMMERCIAL

## 2022-06-08 PROCEDURE — 97530 THERAPEUTIC ACTIVITIES: CPT

## 2022-06-08 PROCEDURE — 97140 MANUAL THERAPY 1/> REGIONS: CPT

## 2022-06-08 PROCEDURE — 97110 THERAPEUTIC EXERCISES: CPT

## 2022-06-08 NOTE — PROGRESS NOTES
PT DAILY TREATMENT NOTE     Patient Name: Jacqueline Gray  Date:2022  : 1960  [x]  Patient  Verified  Payor: Ivania Ponce / Plan: Eva Hutchison / Product Type: HMO /    In time: 300 pm  Out time: 405 pm  Total Treatment Time (min): 65  Visit #: 9 of 12    Medicare/BCBS Only   Total Timed Codes (min): 55 1:1 Treatment Time:  55       Treatment Area: Neck pain [M54.2]    SUBJECTIVE  Pain Level (0-10 scale): 2/10   Any medication changes, allergies to medications, adverse drug reactions, diagnosis change, or new procedure performed?: [x] No    [] Yes (see summary sheet for update)  Subjective functional status/changes:   [] No changes reported  Patient reports that her neck is feeling better. Patient states that her back still hurts. \"It felt better after last visit but it just back afterwards. OBJECTIVE    Modality rationale: decrease inflammation, decrease pain and increase tissue extensibility to improve the patients ability to assist with performing ADL's and functional tasks without limitations.    Min Type Additional Details    [] Estim:  []Unatt       []IFC  []Premod                        []Other:  []w/ice   []w/heat  Position:  Location:    [] Estim: []Att    []TENS instruct  []NMES                    []Other:  []w/US   []w/ice   []w/heat  Position:  Location:    []  Traction: [] Cervical       []Lumbar                       [] Prone          []Supine                       []Intermittent   []Continuous Lbs:  [] before manual  [] after manual    []  Ultrasound: []Continuous   [] Pulsed                           []1MHz   []3MHz W/cm2:  Location:    []  Iontophoresis with dexamethasone         Location: [] Take home patch   [] In clinic   10 []  Ice     [x]  heat  []  Ice massage  []  Laser   []  Anodyne Position: supine  Location:C/S and T/S     []  Laser with stim  []  Other:  Position:  Location:    []  Vasopneumatic Device    []  Right     []  Left  Pre-treatment girth:  Post-treatment girth:  Measured at (location):  Pressure:       [] lo [] med [] hi   Temperature: [] lo [] med [] hi   [] Skin assessment post-treatment:  []intact []redness- no adverse reaction  []redness - adverse reaction:     25 min Therapeutic Exercise:  [x] See flow sheet :   Rationale: increase ROM and increase strength to improve the patients overall muscle endurance and activity tolerance for ADL's and functional tasks. 15 min Therapeutic Activity:  [x]  See flow sheet :   Rationale: increase strength and improve coordination  to improve the patients ability to safely perform dynamic activities and to improve functional performance of ADL's.      min Neuromuscular Re-education:  [x]  See flow sheet :   Rationale: increase strength, improve coordination and improve balance  to improve the patients ability to perform activities with good form, stability and proprioception. 15 min Manual Therapy:  STM/DTM to cervical (in supine position)  and thoracic musculature (in prone position)   The manual therapy interventions were performed at a separate and distinct time from the therapeutic activities interventions. Rationale: decrease pain, increase ROM, increase tissue extensibility, decrease trigger points and increase postural awareness to assist with performing ADL's with ease. With   [] TE   [] TA   [] neuro   [] other: Patient Education: [x] Review HEP    [] Progressed/Changed HEP based on:   [] positioning   [] body mechanics   [] transfers   [] heat/ice application    [] other:      Other Objective/Functional Measures:   TTP and muscle spasms present at (B) rhomboids, thoracic paraspinals and upper trap     Progressed (B) shoulder ER to BTB    Pain Level (0-10 scale) post treatment: less than 0/10     ASSESSMENT/Changes in Function:   Patient is progressing well towards goals. Progressed exercises, as per flow sheet, to assist with increasing postural strength.  Patient tolerated today's exercises well. Patient continues to present to PT with decreased cervical ROM with some exercises. Cervical and thoracic muscle spasms continue to decrease. Patient responded well to today's session, as evident by, a significant decrease in all pain. Patient will continue to benefit from skilled PT services to modify and progress therapeutic interventions, address functional mobility deficits, address ROM deficits, address strength deficits, analyze and address soft tissue restrictions, analyze and cue movement patterns, analyze and modify body mechanics/ergonomics, assess and modify postural abnormalities and instruct in home and community integration to attain remaining goals. []  See Plan of Care  []  See progress note/recertification  []  See Discharge Summary         Progress towards goals / Updated goals:  Short Term Goals: To be accomplished in 2 treatments:   1. Patient will become proficient in their HEP and will be compliant in performing that program.   Evaluation: HEP established. Current: Patient reports doing HEP throughout the day. (6/8/2022)      Long Term Goals: To be accomplished in 4 weeks:   1. Patient's pain level will be 0-3/10 with activity in order to improve patient's ability to perform normal ADLs.    Evaluation: 0-8/10 with activity    Current: Patient reports 3/10 pain today. (6/8/2022)     2. Patient will increase FOTO score to >/= 72 points to indicate increased functional mobility.   Evaluation: 46 points   Current: Progressing, will assess at next progress note. (6/8/2022)     3. Patient will demonstrate pain free cervical left rot and right SB AROM WNL in order to increase ease and safety with driving  Evaluation: Right SB: 15 deg, Left Rot: 25 deg; pain  Current: Progressing (6/8/2022)     4.  Patient will report >/= 75% improvement in HA ss/sx in order to increase efficency with IADL performance and demonstrate a return to PLOF  Evaluation: 0%  Current: Progressing, will assess at next progress note. (6/8/2022)    PLAN  [x]  Upgrade activities as tolerated     [x]  Continue plan of care  []  Update interventions per flow sheet       []  Discharge due to:_  []  Other:_      Hailey Paulino PTA 6/8/2022  3:04 PM    Future Appointments   Date Time Provider Ha Whitten   6/8/2022  3:00 PM Abdoulaye Shepherd PTA [facility]   6/10/2022  3:45 PM Abdoulaye Shepherd PTA [facility]   6/13/2022  4:30 PM Abdoulaye Shepherd PTA [facility]   6/15/2022  4:30 PM Abdoulaye Shepherd PTA [facility]   6/17/2022  4:30 PM Abdoulaye Shepherd PTA [facility]

## 2022-06-10 ENCOUNTER — HOSPITAL ENCOUNTER (OUTPATIENT)
Dept: PHYSICAL THERAPY | Age: 62
Discharge: HOME | End: 2022-06-10
Payer: COMMERCIAL

## 2022-06-10 PROCEDURE — 97110 THERAPEUTIC EXERCISES: CPT

## 2022-06-10 PROCEDURE — 97530 THERAPEUTIC ACTIVITIES: CPT

## 2022-06-10 NOTE — PROGRESS NOTES
PT DAILY TREATMENT NOTE     Patient Name: Neisha Green  Date:6/10/2022  : 1960  [x]  Patient  Verified  Payor: Félix Yee / Plan: Ronda Truong / Product Type: HMO /    In time: 347  pm  Out time: 445 pm  Total Treatment Time (min): 62  Visit #: 10 of 12    Medicare/BCBS Only   Total Timed Codes (min):48 1:1 Treatment Time:  48       Treatment Area: Neck pain [M54.2]    SUBJECTIVE  Pain Level (0-10 scale): 1/10   Any medication changes, allergies to medications, adverse drug reactions, diagnosis change, or new procedure performed?: [x] No    [] Yes (see summary sheet for update)  Subjective functional status/changes:   [] No changes reported  Patient reports that just her back is hurting today. Patient states that she went to MD today and that she wants her to continue with PT.     OBJECTIVE    Modality rationale: decrease inflammation, decrease pain and increase tissue extensibility to improve the patients ability to assist with performing ADL's and functional tasks without limitations.    Min Type Additional Details    [] Estim:  []Unatt       []IFC  []Premod                        []Other:  []w/ice   []w/heat  Position:  Location:    [] Estim: []Att    []TENS instruct  []NMES                    []Other:  []w/US   []w/ice   []w/heat  Position:  Location:    []  Traction: [] Cervical       []Lumbar                       [] Prone          []Supine                       []Intermittent   []Continuous Lbs:  [] before manual  [] after manual    []  Ultrasound: []Continuous   [] Pulsed                           []1MHz   []3MHz W/cm2:  Location:    []  Iontophoresis with dexamethasone         Location: [] Take home patch   [] In clinic   10 []  Ice     [x]  heat  []  Ice massage  []  Laser   []  Anodyne Position: supine  Location:C/S and T/S     []  Laser with stim  []  Other:  Position:  Location:    []  Vasopneumatic Device    []  Right     []  Left  Pre-treatment girth:  Post-treatment girth: Measured at (location):  Pressure:       [] lo [] med [] hi   Temperature: [] lo [] med [] hi   [] Skin assessment post-treatment:  []intact []redness- no adverse reaction  []redness - adverse reaction:     12 min Therapeutic Exercise:  [x] See flow sheet :   Rationale: increase ROM and increase strength to improve the patients overall muscle endurance and activity tolerance for ADL's and functional tasks. 36  min Therapeutic Activity:  [x]  See flow sheet :   Rationale: increase strength and improve coordination  to improve the patients ability to safely perform dynamic activities and to improve functional performance of ADL's.      min Neuromuscular Re-education:  [x]  See flow sheet :   Rationale: increase strength, improve coordination and improve balance  to improve the patients ability to perform activities with good form, stability and proprioception. min Manual Therapy:  STM/DTM to cervical (in supine position)  and thoracic musculature (in prone position)   The manual therapy interventions were performed at a separate and distinct time from the therapeutic activities interventions. Rationale: decrease pain, increase ROM, increase tissue extensibility, decrease trigger points and increase postural awareness to assist with performing ADL's with ease. With   [] TE   [] TA   [] neuro   [] other: Patient Education: [x] Review HEP    [] Progressed/Changed HEP based on:   [] positioning   [] body mechanics   [] transfers   [] heat/ice application    [] other:      Other Objective/Functional Measures:   FOTO Assessment score: 69 points     Pain Level (0-10 scale) post treatment: less than 0/10     ASSESSMENT/Changes in Function:   Patient has attended 10 PT sessions and is progressing well. Patient's tolerance for exercises, functional cervical ROM, cervical pain and FOTO assessment score have all improved since the start of PT.  Although patient reports 75% improvement with HA signs/symtoms, patient still has occurrences of headaches and dizziness but not as frequent. The reports of dizziness come about with certain directions that the patient turns her head. Patient is still unable to lift heavy objects and reach overhead with weighted objects. Patient also reports increased mid back pain that has seemed to improve with PT. Patient would like to continue with PT to further assist with returning patient back to OF. Patient will continue to benefit from skilled PT services to modify and progress therapeutic interventions, address functional mobility deficits, address ROM deficits, address strength deficits, analyze and address soft tissue restrictions, analyze and cue movement patterns, analyze and modify body mechanics/ergonomics, assess and modify postural abnormalities and instruct in home and community integration to attain remaining goals. []  See Plan of Care  [x]  See progress note/recertification  []  See Discharge Summary         Progress towards goals / Updated goals:  Short Term Goals: To be accomplished in 2 treatments:   1. Patient will become proficient in their HEP and will be compliant in performing that program.   Evaluation: HEP established. Current: Patient reports doing HEP throughout the day. (6/10/2022) MET      Long Term Goals: To be accomplished in 4 weeks:   1. Patient's pain level will be 0-3/10 with activity in order to improve patient's ability to perform normal ADLs.    Evaluation: 0-8/10 with activity    Current: Patient reports 1/10 pain today and with ADL's. (6/10/2022) MET      2. Patient will increase FOTO score to >/= 72 points to indicate increased functional mobility.   Evaluation: 46 points   Current: 69 points  (6/8/2022) NOT MET       3.  Patient will demonstrate pain free cervical left rot and right SB AROM WNL in order to increase ease and safety with driving  Evaluation: Right SB: 15 deg, Left Rot: 25 deg; pain  Current: Patient is able to perform pain free cervical left rot and right SB with no increase in pain. Right SB-45 degrees, Left rotation-50 degrees. MET      4. Patient will report >/= 75% improvement in HA ss/sx in order to increase efficency with IADL performance and demonstrate a return to PLOF  Evaluation: 0%  Current: Patient reports over 75% improvement with HA ss/sx.  (6/10/2022) MET     PLAN  [x]  Upgrade activities as tolerated     [x]  Continue plan of care  []  Update interventions per flow sheet       []  Discharge due to:_  []  Other:_      Bhumika Mansfield PTA 6/10/2022  3:04 PM    Future Appointments   Date Time Provider Ha Whitten   6/10/2022  3:45 PM Patricia Bijou, PTA [facility]   6/13/2022  4:30 PM Patricia Bijou, PTA [facility]   6/15/2022  4:30 PM Patricia Bijou, PTA [facility]   6/17/2022  4:30 PM Patricia Bijou, PTA [facility]
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
## 2022-06-13 ENCOUNTER — HOSPITAL ENCOUNTER (OUTPATIENT)
Dept: PHYSICAL THERAPY | Age: 62
Discharge: HOME | End: 2022-06-13
Payer: COMMERCIAL

## 2022-06-13 PROCEDURE — 97110 THERAPEUTIC EXERCISES: CPT

## 2022-06-13 PROCEDURE — 97530 THERAPEUTIC ACTIVITIES: CPT

## 2022-06-13 PROCEDURE — 97140 MANUAL THERAPY 1/> REGIONS: CPT
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
## 2022-06-15 ENCOUNTER — APPOINTMENT (OUTPATIENT)
Dept: PHYSICAL THERAPY | Age: 62
End: 2022-06-15
Payer: COMMERCIAL

## 2022-06-15 ENCOUNTER — HOSPITAL ENCOUNTER (OUTPATIENT)
Dept: PHYSICAL THERAPY | Age: 62
Discharge: HOME | End: 2022-06-15
Payer: COMMERCIAL

## 2022-06-15 PROCEDURE — 97110 THERAPEUTIC EXERCISES: CPT

## 2022-06-15 PROCEDURE — 97140 MANUAL THERAPY 1/> REGIONS: CPT

## 2022-06-15 PROCEDURE — 97530 THERAPEUTIC ACTIVITIES: CPT

## 2022-06-15 NOTE — PROGRESS NOTES
PT DAILY TREATMENT NOTE     Patient Name: Sherman Cramer  Date:6/15/2022  : 1960  [x]  Patient  Verified  Payor: Tricia Velazquez / Plan: Bebo Paulino / Product Type: HMO /    In time: 431 pm  Out time: 530 pm  Total Treatment Time (min): 61  Visit #: 2 of 10    Medicare/BCBS Only   Total Timed Codes (min): 49 1:1 Treatment Time:  49       Treatment Area: Neck pain [M54.2]    SUBJECTIVE  Pain Level (0-10 scale): 0/10   Any medication changes, allergies to medications, adverse drug reactions, diagnosis change, or new procedure performed?: [x] No    [] Yes (see summary sheet for update)  Subjective functional status/changes:   [] No changes reported  Patient reports no pain today. OBJECTIVE    Modality rationale: decrease inflammation, decrease pain and increase tissue extensibility to improve the patients ability to assist with performing ADL's and functional tasks without limitations.    Min Type Additional Details    [] Estim:  []Unatt       []IFC  []Premod                        []Other:  []w/ice   []w/heat  Position:  Location:    [] Estim: []Att    []TENS instruct  []NMES                    []Other:  []w/US   []w/ice   []w/heat  Position:  Location:    []  Traction: [] Cervical       []Lumbar                       [] Prone          []Supine                       []Intermittent   []Continuous Lbs:  [] before manual  [] after manual    []  Ultrasound: []Continuous   [] Pulsed                           []1MHz   []3MHz W/cm2:  Location:    []  Iontophoresis with dexamethasone         Location: [] Take home patch   [] In clinic   10 []  Ice     [x]  heat  []  Ice massage  []  Laser   []  Anodyne Position: reclined  Location:C/S and T/S     []  Laser with stim  []  Other:  Position:  Location:    []  Vasopneumatic Device    []  Right     []  Left  Pre-treatment girth:  Post-treatment girth:  Measured at (location):  Pressure:       [] lo [] med [] hi   Temperature: [] lo [] med [] hi   [] Skin assessment post-treatment:  []intact []redness- no adverse reaction  []redness - adverse reaction:     22 min Therapeutic Exercise:  [x] See flow sheet :   Rationale: increase ROM and increase strength to improve the patients overall muscle endurance and activity tolerance for ADL's and functional tasks. 12  min Therapeutic Activity:  [x]  See flow sheet :   Rationale: increase strength and improve coordination  to improve the patients ability to safely perform dynamic activities and to improve functional performance of ADL's.      min Neuromuscular Re-education:  [x]  See flow sheet :   Rationale: increase strength, improve coordination and improve balance  to improve the patients ability to perform activities with good form, stability and proprioception. 15 min Manual Therapy:  STM/DTM to thoracic musculature (in prone position)   The manual therapy interventions were performed at a separate and distinct time from the therapeutic activities interventions. Rationale: decrease pain, increase ROM, increase tissue extensibility, decrease trigger points and increase postural awareness to assist with performing ADL's with ease. With   [] TE   [] TA   [] neuro   [] other: Patient Education: [x] Review HEP    [] Progressed/Changed HEP based on:   [] positioning   [] body mechanics   [] transfers   [] heat/ice application    [] other:      Other Objective/Functional Measures:   TTP present at thoracic paraspinals     Added tricep extension with BTB, functional overhead reach and wall walks with OTB     Pain Level (0-10 scale) post treatment: 0/10     ASSESSMENT/Changes in Function:   Patient is progressing well towards goals. Progressed exercises, as per flow sheet, to assist with increasing postural strength. Verbal and tactile cues were required for today's new exercises. Patient was challenged with today's exercises.  Patient responded well to today's session, as evident by, no increase in cervical pain and decreased muscle spasms. Patient will continue to benefit from skilled PT services to modify and progress therapeutic interventions, address functional mobility deficits, address ROM deficits, address strength deficits, analyze and address soft tissue restrictions, analyze and cue movement patterns, analyze and modify body mechanics/ergonomics, assess and modify postural abnormalities and instruct in home and community integration to attain remaining goals. []  See Plan of Care  []  See progress note/recertification  []  See Discharge Summary         Progress towards goals / Updated goals:  1. Patient will increase FOTO score to >/= 72 points to indicate increased functional mobility.   PN: 69 points    Current: Progressing, will assess at next progress note. (6/15/2022)   2. Patient will able to tolerate 5# overhead reach x 10 with no increase in cervical pain to improve patient's ability to perform household duties. PN: Not initiated   Current: Patient able to perform 3# overhead reach x 10 with no increase in cervical pain. (6/15/2022)   3. Patient will be able to perform 20# floor to waist box lift with proper form and no increase in cervical pain to increase patient's tolerance for grocery shopping and babysitting. PN: Patient reports that she has not done any lifting since the accident. 4. Patient will report no instances of dizziness for greater than 2 weeks in order to demonstrate a return to PLOF. PN: Patient reports dizziness at times when turing her head a certain way. Current: Reports no dizziness since last visit.  (6/15/2022)       PLAN  [x]  Upgrade activities as tolerated     [x]  Continue plan of care  []  Update interventions per flow sheet       []  Discharge due to:_  []  Other:_      Shiela Little PTA 6/15/2022  3:04 PM    Future Appointments   Date Time Provider Ha Whitten   6/17/2022  4:30 PM [facility]   6/23/2022  2:15 PM [place] Patricia Morales [facility]   6/24/2022  4:30 PM Juan Manuel Hernandez PTA [facility]   6/28/2022  5:15 PM Lenora Shah DPT [facility]   6/29/2022  6:00 PM Juan Manuel Hernandez PTA [facility]   7/6/2022  6:00 PM Gabriela Dennis [facility]   7/7/2022  5:15 PM Lenora Shah DPT [facility]   7/13/2022  6:00 PM Juan Manuel Hernandez PTA [facility]

## 2022-06-16 ENCOUNTER — APPOINTMENT (OUTPATIENT)
Dept: PHYSICAL THERAPY | Age: 62
End: 2022-06-16
Payer: COMMERCIAL

## 2022-06-17 ENCOUNTER — HOSPITAL ENCOUNTER (OUTPATIENT)
Dept: PHYSICAL THERAPY | Age: 62
Discharge: HOME | End: 2022-06-17
Payer: COMMERCIAL

## 2022-06-17 PROCEDURE — 97110 THERAPEUTIC EXERCISES: CPT

## 2022-06-17 PROCEDURE — 97530 THERAPEUTIC ACTIVITIES: CPT

## 2022-06-17 PROCEDURE — 97112 NEUROMUSCULAR REEDUCATION: CPT

## 2022-06-17 NOTE — PROGRESS NOTES
PT DAILY TREATMENT NOTE     Patient Name: Laura Puckett  Date:2022  : 1960  [x]  Patient  Verified  Payor: Leonard Acevedo / Plan: Vicenta Krishna / Product Type: HMO /    In time: 425 pm  Out time: 520 pm  Total Treatment Time (min): 54   Visit #: 3 of 10    Medicare/BCBS Only   Total Timed Codes (min): 55  1:1 Treatment Time: 55        Treatment Area: Neck pain [M54.2]    SUBJECTIVE  Pain Level (0-10 scale): 0/10   Any medication changes, allergies to medications, adverse drug reactions, diagnosis change, or new procedure performed?: [x] No    [] Yes (see summary sheet for update)  Subjective functional status/changes:   [] No changes reported  Patient reports that her back does not bother her until she uses it.      OBJECTIVE    Modality rationale: Patient declined today    Min Type Additional Details    [] Estim:  []Unatt       []IFC  []Premod                        []Other:  []w/ice   []w/heat  Position:  Location:    [] Estim: []Att    []TENS instruct  []NMES                    []Other:  []w/US   []w/ice   []w/heat  Position:  Location:    []  Traction: [] Cervical       []Lumbar                       [] Prone          []Supine                       []Intermittent   []Continuous Lbs:  [] before manual  [] after manual    []  Ultrasound: []Continuous   [] Pulsed                           []1MHz   []3MHz W/cm2:  Location:    []  Iontophoresis with dexamethasone         Location: [] Take home patch   [] In clinic    []  Ice     []  heat  []  Ice massage  []  Laser   []  Anodyne Position:   Location:    []  Laser with stim  []  Other:  Position:  Location:    []  Vasopneumatic Device    []  Right     []  Left  Pre-treatment girth:  Post-treatment girth:  Measured at (location):  Pressure:       [] lo [] med [] hi   Temperature: [] lo [] med [] hi   [] Skin assessment post-treatment:  []intact []redness- no adverse reaction  []redness - adverse reaction:     20 min Therapeutic Exercise:  [x] See flow sheet :   Rationale: increase ROM and increase strength to improve the patients overall muscle endurance and activity tolerance for ADL's and functional tasks. 25 min Therapeutic Activity:  [x]  See flow sheet : Self-care management/Review HEP    Rationale: increase strength and improve coordination  to improve the patients ability to safely perform dynamic activities and to improve functional performance of ADL's. 10 min Neuromuscular Re-education:  [x]  See flow sheet :   Rationale: increase strength, improve coordination and improve balance  to improve the patients ability to perform activities with good form, stability and proprioception. min Manual Therapy:  STM/DTM to thoracic musculature (in prone position)   The manual therapy interventions were performed at a separate and distinct time from the therapeutic activities interventions. Rationale: decrease pain, increase ROM, increase tissue extensibility, decrease trigger points and increase postural awareness to assist with performing ADL's with ease. With   [] TE   [] TA   [] neuro   [] other: Patient Education: [x] Review HEP    [] Progressed/Changed HEP based on:   [] positioning   [] body mechanics   [] transfers   [] heat/ice application    [] other:      Other Objective/Functional Measures: Added rhomboid stretch and S/L ER with 3#    Pain Level (0-10 scale) post treatment: 0/10     ASSESSMENT/Changes in Function:   Patient is progressing well towards goals. Progressed exercises, as per flow sheet, to assist with increasing postural strength. Patient was challenged with today's exercises. Reviewed and updated HEP. Patient verbalized and demonstrated understanding. Patient responded well to today's session, as evident by, no increase in cervical pain and improved exercise tolerance.  Patient will continue to benefit from skilled PT services to modify and progress therapeutic interventions, address functional mobility deficits, address ROM deficits, address strength deficits, analyze and address soft tissue restrictions, analyze and cue movement patterns, analyze and modify body mechanics/ergonomics, assess and modify postural abnormalities and instruct in home and community integration to attain remaining goals. []  See Plan of Care  []  See progress note/recertification  []  See Discharge Summary         Progress towards goals / Updated goals:  1. Patient will increase FOTO score to >/= 72 points to indicate increased functional mobility.   PN: 69 points    Current: Progressing, will assess at next progress note. (6/17/2022)   2. Patient will able to tolerate 5# overhead reach x 10 with no increase in cervical pain to improve patient's ability to perform household duties. PN: Not initiated   Current: Patient able to perform 3# overhead reach x 10 with no increase in cervical pain. (6/17/2022)   3. Patient will be able to perform 20# floor to waist box lift with proper form and no increase in cervical pain to increase patient's tolerance for grocery shopping and babysitting. PN: Patient reports that she has not done any lifting since the accident. 4. Patient will report no instances of dizziness for greater than 2 weeks in order to demonstrate a return to PLOF. PN: Patient reports dizziness at times when turing her head a certain way. Current: Reports no dizziness since last visit.  (6/17/2022)       PLAN  [x]  Upgrade activities as tolerated     [x]  Continue plan of care  []  Update interventions per flow sheet       []  Discharge due to:_  []  Other:_      Carol Velazquez PTA 6/17/2022  3:04 PM    Future Appointments   Date Time Provider Ha Whitten   6/23/2022  2:15 PM Unknown INDIRA Manzo [facility]   6/24/2022  4:30 PM Unknown INDIRA Manzo [facility]   6/28/2022  5:15 PM Lewis Morley DPT [facility]   6/29/2022  6:00 PM Unknown INDIRA Manzo [facility]   7/6/2022  6:00 PM Unknown INDIRA Manzo [facility]   7/7/2022  5:15 PM Greyson Stevenson DPT [facility]   7/13/2022  6:00 PM Catherine Mo PTA [facility]

## 2022-06-23 ENCOUNTER — HOSPITAL ENCOUNTER (OUTPATIENT)
Dept: PHYSICAL THERAPY | Age: 62
Discharge: HOME | End: 2022-06-23
Payer: COMMERCIAL

## 2022-06-23 PROCEDURE — 97112 NEUROMUSCULAR REEDUCATION: CPT

## 2022-06-23 PROCEDURE — 97530 THERAPEUTIC ACTIVITIES: CPT

## 2022-06-23 PROCEDURE — 97140 MANUAL THERAPY 1/> REGIONS: CPT

## 2022-06-23 PROCEDURE — 97110 THERAPEUTIC EXERCISES: CPT

## 2022-06-23 NOTE — PROGRESS NOTES
PT DAILY TREATMENT NOTE     Patient Name: Joslyn Cameron  Date:2022  : 1960  [x]  Patient  Verified  Payor: Danielle Face / Plan: Karmen Hanson / Product Type: HMO /    In time: 215 pm  Out time: 318  pm  Total Treatment Time (min): 63  Visit #: 4 of 10    Medicare/BCBS Only   Total Timed Codes (min): 53  1:1 Treatment Time: 53        Treatment Area: Neck pain [M54.2]    SUBJECTIVE  Pain Level (0-10 scale): 4/10   Any medication changes, allergies to medications, adverse drug reactions, diagnosis change, or new procedure performed?: [x] No    [] Yes (see summary sheet for update)  Subjective functional status/changes:   [] No changes reported  Patient reports that her neck is a little sore from not being at PT. \"I think it may also be the weather. \"  Patient explains that she did well with the HEP that she was given. OBJECTIVE    Modality rationale: decrease inflammation, decrease pain and increase tissue extensibility to improve the patients ability to assist with performing ADL's and functional tasks without limitations.    Min Type Additional Details    [] Estim:  []Unatt       []IFC  []Premod                        []Other:  []w/ice   []w/heat  Position:  Location:    [] Estim: []Att    []TENS instruct  []NMES                    []Other:  []w/US   []w/ice   []w/heat  Position:  Location:    []  Traction: [] Cervical       []Lumbar                       [] Prone          []Supine                       []Intermittent   []Continuous Lbs:  [] before manual  [] after manual    []  Ultrasound: []Continuous   [] Pulsed                           []1MHz   []3MHz W/cm2:  Location:    []  Iontophoresis with dexamethasone         Location: [] Take home patch   [] In clinic   10 []  Ice     [x]  heat  []  Ice massage  []  Laser   []  Anodyne Position: reclined  Location:C/S    []  Laser with stim  []  Other:  Position:  Location:    []  Vasopneumatic Device    []  Right     []  Left  Pre-treatment girth:  Post-treatment girth:  Measured at (location):  Pressure:       [] lo [] med [] hi   Temperature: [] lo [] med [] hi   [] Skin assessment post-treatment:  []intact []redness- no adverse reaction  []redness - adverse reaction:     15 min Therapeutic Exercise:  [x] See flow sheet :   Rationale: increase ROM and increase strength to improve the patients overall muscle endurance and activity tolerance for ADL's and functional tasks. 15 min Therapeutic Activity:  [x]  See flow sheet :    Rationale: increase strength and improve coordination  to improve the patients ability to safely perform dynamic activities and to improve functional performance of ADL's.      13 min Neuromuscular Re-education:  [x]  See flow sheet :   Rationale: increase strength, improve coordination and improve balance  to improve the patients ability to perform activities with good form, stability and proprioception. 10 min Manual Therapy:  STM/DTM to cervical musculature (in supine position)   The manual therapy interventions were performed at a separate and distinct time from the therapeutic activities interventions. Rationale: decrease pain, increase ROM, increase tissue extensibility, decrease trigger points and increase postural awareness to assist with performing ADL's with ease. With   [] TE   [] TA   [] neuro   [] other: Patient Education: [x] Review HEP    [] Progressed/Changed HEP based on:   [] positioning   [] body mechanics   [] transfers   [] heat/ice application    [] other:      Other Objective/Functional Measures: Added wall walks with OTB, 15# bax lift and carry    Progressed overhead reach to 4#    Pain Level (0-10 scale) post treatment: 0/10     ASSESSMENT/Changes in Function:   Patient is progressing well towards goals. Progressed exercises, as per flow sheet, to assist with increasing functional strength for everyday tasks. Patient tolerated today's box lift with no increase in cervical pain. Verbal cues were required for proper form. Patient experienced slight dizziness when getting up from the table after manual therapy. Patient responded well to today's session, as evident by, no increase in cervical pain and improved activity tolerance. Patient will continue to benefit from skilled PT services to modify and progress therapeutic interventions, address functional mobility deficits, address ROM deficits, address strength deficits, analyze and address soft tissue restrictions, analyze and cue movement patterns, analyze and modify body mechanics/ergonomics, assess and modify postural abnormalities and instruct in home and community integration to attain remaining goals. []  See Plan of Care  []  See progress note/recertification  []  See Discharge Summary         Progress towards goals / Updated goals:  1. Patient will increase FOTO score to >/= 72 points to indicate increased functional mobility.   PN: 69 points    Current: Progressing, will assess at next progress note. (6/23/2022)     2. Patient will able to tolerate 5# overhead reach x 10 with no increase in cervical pain to improve patient's ability to perform household duties. PN: Not initiated   Current: Patient able to perform 4# overhead reach x 10 with no increase in cervical pain. (6/23/2022)      3. Patient will be able to perform 20# floor to waist box lift with proper form and no increase in cervical pain to increase patient's tolerance for grocery shopping and babysitting. PN: Patient reports that she has not done any lifting since the accident. Current: Patient was able to perform 15# box lift with no increase in pain. Verbal cues were required for proper form. (6/23/2022)    4. Patient will report no instances of dizziness for greater than 2 weeks in order to demonstrate a return to PLOF. PN: Patient reports dizziness at times when turing her head a certain way.    Current: Reports no dizziness since last visit but experienced some when getting up from the table too fast. (6/23/2022)       PLAN  [x]  Upgrade activities as tolerated     [x]  Continue plan of care  []  Update interventions per flow sheet       []  Discharge due to:_  []  Other:_      Ramy Hernandez, INDIRA 6/23/2022  3:04 PM    Future Appointments   Date Time Provider Ha Maria Dolores   6/23/2022  2:15 PM Everet Sandoval, PTA [facility]   6/24/2022  4:30 PM Everet Sandoval, PTA [facility]   6/28/2022  5:15 PM Raimundo Purvis DPT [facility]   6/29/2022  6:00 PM Everet Sandoval, PTA [facility]   7/6/2022  6:00 PM Everet Sandoval, PTA [facility]   7/7/2022  5:15 PM Raimundo Purvis DPT [facility]   7/13/2022  6:00 PM Everet Sandoval, PTA [facility]

## 2022-06-24 ENCOUNTER — HOSPITAL ENCOUNTER (OUTPATIENT)
Dept: PHYSICAL THERAPY | Age: 62
Discharge: HOME | End: 2022-06-24
Payer: COMMERCIAL

## 2022-06-24 PROCEDURE — 97140 MANUAL THERAPY 1/> REGIONS: CPT

## 2022-06-24 PROCEDURE — 97110 THERAPEUTIC EXERCISES: CPT

## 2022-06-24 PROCEDURE — 97112 NEUROMUSCULAR REEDUCATION: CPT

## 2022-06-24 NOTE — PROGRESS NOTES
PT DAILY TREATMENT NOTE     Patient Name: Kaiser Merlos  Date:2022  : 1960  [x]  Patient  Verified  Payor: Kaz Stuart / Plan: Abi Copas / Product Type: HMO /    In time: 447 pm  Out time: 527 pm  Total Treatment Time (min): 40  Visit #: 4 of 10    Medicare/BCBS Only   Total Timed Codes (min): 30  1:1 Treatment Time: 30        Treatment Area: Neck pain [M54.2]    SUBJECTIVE  Pain Level (0-10 scale): 0/10   Any medication changes, allergies to medications, adverse drug reactions, diagnosis change, or new procedure performed?: [x] No    [] Yes (see summary sheet for update)  Subjective functional status/changes:   [] No changes reported  Patient reports that her neck feels pretty good today. OBJECTIVE    Modality rationale: decrease inflammation, decrease pain and increase tissue extensibility to improve the patients ability to assist with performing ADL's and functional tasks without limitations.    Min Type Additional Details    [] Estim:  []Unatt       []IFC  []Premod                        []Other:  []w/ice   []w/heat  Position:  Location:    [] Estim: []Att    []TENS instruct  []NMES                    []Other:  []w/US   []w/ice   []w/heat  Position:  Location:    []  Traction: [] Cervical       []Lumbar                       [] Prone          []Supine                       []Intermittent   []Continuous Lbs:  [] before manual  [] after manual    []  Ultrasound: []Continuous   [] Pulsed                           []1MHz   []3MHz W/cm2:  Location:    []  Iontophoresis with dexamethasone         Location: [] Take home patch   [] In clinic   10 []  Ice     [x]  heat  []  Ice massage  []  Laser   []  Anodyne Position: reclined  Location:C/S    []  Laser with stim  []  Other:  Position:  Location:    []  Vasopneumatic Device    []  Right     []  Left  Pre-treatment girth:  Post-treatment girth:  Measured at (location):  Pressure:       [] lo [] med [] hi   Temperature: [] lo [] med [] hi   [] Skin assessment post-treatment:  []intact []redness- no adverse reaction  []redness - adverse reaction:     15 min Therapeutic Exercise:  [x] See flow sheet :   Rationale: increase ROM and increase strength to improve the patients overall muscle endurance and activity tolerance for ADL's and functional tasks. min Therapeutic Activity:  [x]  See flow sheet :    Rationale: increase strength and improve coordination  to improve the patients ability to safely perform dynamic activities and to improve functional performance of ADL's. 15  min Neuromuscular Re-education:  [x]  See flow sheet :   Rationale: increase strength, improve coordination and improve balance  to improve the patients ability to perform activities with good form, stability and proprioception. 10 min Manual Therapy:  STM/DTM to cervical musculature (in supine position)   The manual therapy interventions were performed at a separate and distinct time from the therapeutic activities interventions. Rationale: decrease pain, increase ROM, increase tissue extensibility, decrease trigger points and increase postural awareness to assist with performing ADL's with ease. With   [] TE   [] TA   [] neuro   [] other: Patient Education: [x] Review HEP    [] Progressed/Changed HEP based on:   [] positioning   [] body mechanics   [] transfers   [] heat/ice application    [] other:      Other Objective/Functional Measures:     Progressed serratus punches to 4#    Pain Level (0-10 scale) post treatment: 0/10     ASSESSMENT/Changes in Function:   Patient is progressing well towards goals. Progressed exercises, as per flow sheet, to assist with increasing postural strength. Cervical muscle spasms continue to improve. Patient responded well to today's session, as evident by, no increase in cervical pain and improved activity tolerance.  Patient will continue to benefit from skilled PT services to modify and progress therapeutic interventions, address functional mobility deficits, address ROM deficits, address strength deficits, analyze and address soft tissue restrictions, analyze and cue movement patterns, analyze and modify body mechanics/ergonomics, assess and modify postural abnormalities and instruct in home and community integration to attain remaining goals. []  See Plan of Care  []  See progress note/recertification  []  See Discharge Summary         Progress towards goals / Updated goals:  1. Patient will increase FOTO score to >/= 72 points to indicate increased functional mobility.   PN: 69 points    Current: Progressing, will assess at next progress note. (6/24/2022)     2. Patient will able to tolerate 5# overhead reach x 10 with no increase in cervical pain to improve patient's ability to perform household duties. PN: Not initiated   Current: Patient able to perform 4# overhead reach x 10 with no increase in cervical pain. (6/24/2022)      3. Patient will be able to perform 20# floor to waist box lift with proper form and no increase in cervical pain to increase patient's tolerance for grocery shopping and babysitting. PN: Patient reports that she has not done any lifting since the accident. Current: Patient was able to perform 15# box lift with no increase in pain. Verbal cues were required for proper form. (6/24/2022)    4. Patient will report no instances of dizziness for greater than 2 weeks in order to demonstrate a return to PLOF. PN: Patient reports dizziness at times when turing her head a certain way.    Current: Reports no dizziness since last visit but experienced some when getting up from the table too fast. (6/24/2022)       PLAN  [x]  Upgrade activities as tolerated     [x]  Continue plan of care  []  Update interventions per flow sheet       []  Discharge due to:_  []  Other:_      Bishop Pair, PTA 6/24/2022  3:04 PM    Future Appointments   Date Time Provider Ha Whitten   6/28/2022  5:15 PM Terrie Croft, [place] [facility]   6/29/2022  6:00 PM Gisell Cost, PTA [facility]   7/6/2022  6:00 PM Gisell Cost, [place] [facility]   7/7/2022  5:15 PM Terrie Croft, [place] [facility]   7/13/2022  6:00 PM Gisell Cost, PTA [facility]

## 2022-06-28 ENCOUNTER — HOSPITAL ENCOUNTER (OUTPATIENT)
Dept: PHYSICAL THERAPY | Age: 62
Discharge: HOME | End: 2022-06-28
Payer: COMMERCIAL

## 2022-06-28 PROCEDURE — 97530 THERAPEUTIC ACTIVITIES: CPT

## 2022-06-28 PROCEDURE — 97110 THERAPEUTIC EXERCISES: CPT

## 2022-06-28 PROCEDURE — 97112 NEUROMUSCULAR REEDUCATION: CPT

## 2022-06-28 PROCEDURE — 97535 SELF CARE MNGMENT TRAINING: CPT

## 2022-06-28 NOTE — PROGRESS NOTES
PT DAILY TREATMENT NOTE     Patient Name: Zully Infante  Date:2022  : 1960  [x]  Patient  Verified  Payor: Kayla Quinn / Plan: Kinsey Dorsey / Product Type: HMO /    In time: 5:20P  Out time: 6:13P  Total Treatment Time (min): 53  Visit #: 6 of 10    Medicare/BCBS Only   Total Timed Codes (min): 53  1:1 Treatment Time: 53       Treatment Area: Neck pain [M54.2]    SUBJECTIVE  Pain Level (0-10 scale): 2-3/10   Any medication changes, allergies to medications, adverse drug reactions, diagnosis change, or new procedure performed?: [x] No    [] Yes (see summary sheet for update)  Subjective functional status/changes:   [] No changes reported  Patient reports continuing to experience intermittent discomfort at mid back/neck region depending on the day, however overall significant improvements since beginning PT. Reports recently only experiencing dizziness ss/sx x1 in past week, in which correlated to quick movement - reports symptoms subsided within seconds. Overall wants to return to performing home workout (instructional videos)    OBJECTIVE    16 min Therapeutic Exercise:  [x] See flow sheet :   Rationale: increase ROM and increase strength to improve the patients overall muscle endurance and activity tolerance for ADL's and functional tasks. 15 min Therapeutic Activity:  [x]  See flow sheet :    Rationale: increase strength and improve coordination  to improve the patients ability to safely perform dynamic activities and to improve functional performance of ADL's. 14 min Neuromuscular Re-education:  [x]  See flow sheet :   Rationale: increase strength, improve coordination and improve balance  to improve the patients ability to perform activities with good form, stability and proprioception.     8 min Self Care Management:  []  See flow sheet : Pt ed on appropriate/ gradual return to recreational/workout videos w/ strategies provided in order to return to PLOF w/o presence of flare up and/or set back - pt verbaly demonstrates knowledge and understanding of this    Rationale: increase pt ed/awarness  to improve the patients ability to fully return to PLOF w/ decreasing change for flare up/set backs in potential           With   [x] TE   [x] TA   [x] neuro   [] other: Patient Education: [x] Review HEP    [] Progressed/Changed HEP based on:   [] positioning   [] body mechanics   [] transfers   [] heat/ice application    [x] other: new TBs cut/issued to pt      Other Objective/Functional Measures:   Progressed ex program per flow sheet - increased reps/resistance of activities performed over previous session, added bicep curl   See Goals Below     Pain Level (0-10 scale) post treatment: 0/10     ASSESSMENT/Changes in Function:   Patient continues to make progress toward LTGs, evident by objective measures/goals below. Held manual therapy and modality application during today's session w/ emphasis on progressive ex, in which pt tolerated well, demonstrating positive response through report of a complete reduction in symptoms post session. Will perform MT/apply modalities as needed pending pt response next session. Pt ed (see above) provided. Patient will continue to benefit from skilled PT services to modify and progress therapeutic interventions, address functional mobility deficits, address ROM deficits, address strength deficits, analyze and address soft tissue restrictions, analyze and cue movement patterns, analyze and modify body mechanics/ergonomics, assess and modify postural abnormalities and instruct in home and community integration to attain remaining goals. [x]  See Plan of Care  []  See progress note/recertification  []  See Discharge Summary         Progress towards goals / Updated goals:  1. Patient will increase FOTO score to >/= 72 points to indicate increased functional mobility.   PN: 69 points    Current: will assess at next progress note. (6/28/2022)     2.  Patient will able to tolerate 5# overhead reach x 10 with no increase in cervical pain to improve patient's ability to perform household duties. PN: Not initiated   Current: Progressing: Patient able to perform 4# overhead reach/lift  2x 12 with no increase in cervical pain. (6/28/2022)      3. Patient will be able to perform 20# floor to waist box lift with proper form and no increase in cervical pain to increase patient's tolerance for grocery shopping and babysitting. PN: Patient reports that she has not done any lifting since the accident. Current: Progressing: Patient  able to perform 20# box lift with no increase in pain. Verbal cues were required for proper form. (6/28/2022)    4. Patient will report no instances of dizziness for greater than 2 weeks in order to demonstrate a return to PLOF. PN: Patient reports dizziness at times when turing her head a certain way.    Current: Progressing: experienced dizziness ss/sx x1 in past week, in which correlated to quick movement - reports symptoms subsided within seconds (6/28/2022)       PLAN  [x]  Upgrade activities as tolerated     [x]  Continue plan of care  [x]  Update interventions per flow sheet       []  Discharge due to:_  []  Other:_      Romel Villa DPT 6/28/2022  3:04 PM    Future Appointments   Date Time Provider Ha Whitten   6/29/2022  6:00 PM Gabriela Hawk [facility]   7/6/2022  6:00 PM Wilbert Leo PTA [facility]   7/7/2022  5:15 PM Emiliana To DPT [facility]   7/13/2022  6:00 PM Wilbert Leo PTA [facility]

## 2022-06-29 ENCOUNTER — HOSPITAL ENCOUNTER (OUTPATIENT)
Dept: PHYSICAL THERAPY | Age: 62
Discharge: HOME | End: 2022-06-29
Payer: COMMERCIAL

## 2022-06-29 PROCEDURE — 97140 MANUAL THERAPY 1/> REGIONS: CPT

## 2022-06-29 PROCEDURE — 97110 THERAPEUTIC EXERCISES: CPT

## 2022-06-29 PROCEDURE — 97112 NEUROMUSCULAR REEDUCATION: CPT

## 2022-06-29 NOTE — PROGRESS NOTES
PT DAILY TREATMENT NOTE     Patient Name: Jose Juan Donahue  Date:2022  : 1960  [x]  Patient  Verified  Payor: Ken Calix / Plan: Zuly Nesbitt / Product Type: HMO /    In time: 600 pm  Out time: 700 pm  Total Treatment Time (min): 60  Visit #: 7 of 10    Medicare/BCBS Only   Total Timed Codes (min): 50 1:1 Treatment Time: 50         Treatment Area: Neck pain [M54.2]    SUBJECTIVE  Pain Level (0-10 scale): 10 (sore)   Any medication changes, allergies to medications, adverse drug reactions, diagnosis change, or new procedure performed?: [x] No    [] Yes (see summary sheet for update)  Subjective functional status/changes:   [] No changes reported  Patient reports that she is sore from last session. OBJECTIVE    Modality rationale: decrease inflammation, decrease pain and increase tissue extensibility to improve the patients ability to assist with performing ADL's and functional tasks without limitations.    Min Type Additional Details    [] Estim:  []Unatt       []IFC  []Premod                        []Other:  []w/ice   []w/heat  Position:  Location:    [] Estim: []Att    []TENS instruct  []NMES                    []Other:  []w/US   []w/ice   []w/heat  Position:  Location:    []  Traction: [] Cervical       []Lumbar                       [] Prone          []Supine                       []Intermittent   []Continuous Lbs:  [] before manual  [] after manual    []  Ultrasound: []Continuous   [] Pulsed                           []1MHz   []3MHz W/cm2:  Location:    []  Iontophoresis with dexamethasone         Location: [] Take home patch   [] In clinic   10 []  Ice     [x]  heat  []  Ice massage  []  Laser   []  Anodyne Position: reclined  Location:C/S    []  Laser with stim  []  Other:  Position:  Location:    []  Vasopneumatic Device    []  Right     []  Left  Pre-treatment girth:  Post-treatment girth:  Measured at (location):  Pressure:       [] lo [] med [] hi   Temperature: [] lo [] med [] hi   [] Skin assessment post-treatment:  []intact []redness- no adverse reaction  []redness - adverse reaction:     20 min Therapeutic Exercise:  [x] See flow sheet :   Rationale: increase ROM and increase strength to improve the patients overall muscle endurance and activity tolerance for ADL's and functional tasks. min Therapeutic Activity:  [x]  See flow sheet :    Rationale: increase strength and improve coordination  to improve the patients ability to safely perform dynamic activities and to improve functional performance of ADL's. 15  min Neuromuscular Re-education:  [x]  See flow sheet :   Rationale: increase strength, improve coordination and improve balance  to improve the patients ability to perform activities with good form, stability and proprioception. 15 min Manual Therapy:  STM/DTM to cervical musculature (in supine position)   The manual therapy interventions were performed at a separate and distinct time from the therapeutic activities interventions. Rationale: decrease pain, increase ROM, increase tissue extensibility, decrease trigger points and increase postural awareness to assist with performing ADL's with ease. With   [] TE   [] TA   [] neuro   [] other: Patient Education: [x] Review HEP    [] Progressed/Changed HEP based on:   [] positioning   [] body mechanics   [] transfers   [] heat/ice application    [] other:      Other Objective/Functional Measures:  Muscle spasms present at rhomboids and levator scap    Pain Level (0-10 scale) post treatment: 0/10     ASSESSMENT/Changes in Function:   Patient is progressing well towards goals. Patient performed exercises, as per flow sheet, to assist with increasing postural strength. Patient performed exercises with proper form. Patient responded well to today's session, as evident by, decreased cervical muscle spasms and soreness.  Patient will continue to benefit from skilled PT services to modify and progress therapeutic interventions, address functional mobility deficits, address ROM deficits, address strength deficits, analyze and address soft tissue restrictions, analyze and cue movement patterns, analyze and modify body mechanics/ergonomics, assess and modify postural abnormalities and instruct in home and community integration to attain remaining goals. []  See Plan of Care  []  See progress note/recertification  []  See Discharge Summary         Progress towards goals / Updated goals:  1. Patient will increase FOTO score to >/= 72 points to indicate increased functional mobility.   PN: 69 points    Current: Progressing, will assess at next progress note. (6/29/2022)     2. Patient will able to tolerate 5# overhead reach x 10 with no increase in cervical pain to improve patient's ability to perform household duties. PN: Not initiated   Current: Patient able to perform 4# overhead reach x 10 with no increase in cervical pain. (6/29/2022)      3. Patient will be able to perform 20# floor to waist box lift with proper form and no increase in cervical pain to increase patient's tolerance for grocery shopping and babysitting. PN: Patient reports that she has not done any lifting since the accident. Current: Patient was able to perform 15# box lift with no increase in pain. Verbal cues were required for proper form. (6/29/2022)    4. Patient will report no instances of dizziness for greater than 2 weeks in order to demonstrate a return to PLOF. PN: Patient reports dizziness at times when turing her head a certain way.    Current: Reports no dizziness since last visit but experienced some when getting up from the table too fast. (6/29/2022)       PLAN  [x]  Upgrade activities as tolerated     [x]  Continue plan of care  []  Update interventions per flow sheet       []  Discharge due to:_  []  Other:_      Leatha [place], PTA 6/29/2022  3:04 PM    Future Appointments   Date Time Provider Ha Whitten   6/29/2022  6:00 PM Marion Rivas PTA [facility]   7/6/2022  6:00 PM Gabriela Su [facility]   7/7/2022  5:15 PM Barbie Kang DPT [facility]   7/13/2022  6:00 PM Marion Rivas PTA [facility]

## 2022-07-06 ENCOUNTER — HOSPITAL ENCOUNTER (OUTPATIENT)
Dept: PHYSICAL THERAPY | Age: 62
Discharge: HOME | End: 2022-07-06
Payer: COMMERCIAL

## 2022-07-06 PROCEDURE — 97140 MANUAL THERAPY 1/> REGIONS: CPT

## 2022-07-06 PROCEDURE — 97110 THERAPEUTIC EXERCISES: CPT

## 2022-07-06 PROCEDURE — 97112 NEUROMUSCULAR REEDUCATION: CPT

## 2022-07-06 NOTE — PROGRESS NOTES
PT DAILY TREATMENT NOTE     Patient Name: Janel Ely  Date:2022  : 1960  [x]  Patient  Verified  Payor: Judy Leigh / Plan: Lennox Cea / Product Type: HMO /    In time: 604 pm  Out time: 658 pm  Total Treatment Time (min): 54  Visit #: 8 of 10    Medicare/BCBS Only   Total Timed Codes (min): 44 1:1 Treatment Time: 44        Treatment Area: Neck pain [M54.2]    SUBJECTIVE  Pain Level (0-10 scale): 0/10 (sore)   Any medication changes, allergies to medications, adverse drug reactions, diagnosis change, or new procedure performed?: [x] No    [] Yes (see summary sheet for update)  Subjective functional status/changes:   [] No changes reported  Patient reports that her neck feels fine today. Patient states that her triceps are sore today. OBJECTIVE    Modality rationale: decrease inflammation, decrease pain and increase tissue extensibility to improve the patients ability to assist with performing ADL's and functional tasks without limitations.    Min Type Additional Details    [] Estim:  []Unatt       []IFC  []Premod                        []Other:  []w/ice   []w/heat  Position:  Location:    [] Estim: []Att    []TENS instruct  []NMES                    []Other:  []w/US   []w/ice   []w/heat  Position:  Location:    []  Traction: [] Cervical       []Lumbar                       [] Prone          []Supine                       []Intermittent   []Continuous Lbs:  [] before manual  [] after manual    []  Ultrasound: []Continuous   [] Pulsed                           []1MHz   []3MHz W/cm2:  Location:    []  Iontophoresis with dexamethasone         Location: [] Take home patch   [] In clinic   10 []  Ice     [x]  heat  []  Ice massage  []  Laser   []  Anodyne Position: reclined  Location:C/S    []  Laser with stim  []  Other:  Position:  Location:    []  Vasopneumatic Device    []  Right     []  Left  Pre-treatment girth:  Post-treatment girth:  Measured at (location):  Pressure:       [] lo [] med [] hi   Temperature: [] lo [] med [] hi   [] Skin assessment post-treatment:  []intact []redness- no adverse reaction  []redness - adverse reaction:     20 min Therapeutic Exercise:  [x] See flow sheet :   Rationale: increase ROM and increase strength to improve the patients overall muscle endurance and activity tolerance for ADL's and functional tasks. min Therapeutic Activity:  [x]  See flow sheet :    Rationale: increase strength and improve coordination  to improve the patients ability to safely perform dynamic activities and to improve functional performance of ADL's. 14  min Neuromuscular Re-education:  [x]  See flow sheet :   Rationale: increase strength, improve coordination and improve balance  to improve the patients ability to perform activities with good form, stability and proprioception. 10 min Manual Therapy:  STM/DTM to cervical musculature, manual stretching.  (in supine position)   The manual therapy interventions were performed at a separate and distinct time from the therapeutic activities interventions. Rationale: decrease pain, increase ROM, increase tissue extensibility, decrease trigger points and increase postural awareness to assist with performing ADL's with ease. With   [] TE   [] TA   [] neuro   [] other: Patient Education: [x] Review HEP    [] Progressed/Changed HEP based on:   [] positioning   [] body mechanics   [] transfers   [] heat/ice application    [] other:      Other Objective/Functional Measures:  Muscle spasms present at rhomboids and levator scap    Progressed bicep curls and functional reach weight to 5#    Pain Level (0-10 scale) post treatment: 0/10     ASSESSMENT/Changes in Function:   Patient is progressing well towards goals. Progressed exercises, as per flow sheet, to assist with increasing functional reaching strength. Patient tolerated today's exercises well. Cervical muscle spasms continue to decrease.  Patient achieved functional reach goal today. Patient responded well to today's session, as evident by, improved exercise tolerance. Patient will continue to benefit from skilled PT services to modify and progress therapeutic interventions, address functional mobility deficits, address ROM deficits, address strength deficits, analyze and address soft tissue restrictions, analyze and cue movement patterns, analyze and modify body mechanics/ergonomics, assess and modify postural abnormalities and instruct in home and community integration to attain remaining goals. []  See Plan of Care  []  See progress note/recertification  []  See Discharge Summary         Progress towards goals / Updated goals:  1. Patient will increase FOTO score to >/= 72 points to indicate increased functional mobility.   PN: 69 points    Current: Progressing, will assess at next progress note. (7/6/2022)     2. Patient will able to tolerate 5# overhead reach x 10 with no increase in cervical pain to improve patient's ability to perform household duties. PN: Not initiated   Current: Patient able to perform 5# overhead reach x 10 with no increase in cervical pain. (7/6/2022) MET     3. Patient will be able to perform 20# floor to waist box lift with proper form and no increase in cervical pain to increase patient's tolerance for grocery shopping and babysitting. PN: Patient reports that she has not done any lifting since the accident. Current: Patient was able to perform 20# box lift with no increase in pain. Verbal cues were required for proper form. (7/6/2022) MET     4. Patient will report no instances of dizziness for greater than 2 weeks in order to demonstrate a return to PLOF. PN: Patient reports dizziness at times when turing her head a certain way.    Current: Reports no dizziness since last visit but experienced some when getting up from the table too fast. (6/29/2022)       PLAN  [x]  Upgrade activities as tolerated     [x]  Continue plan of care  [] Update interventions per flow sheet       []  Discharge due to:_  []  Other:_      Yane Santos, INDIRA 7/6/2022  3:04 PM    Future Appointments   Date Time Provider Ha Whitten   7/7/2022  5:15 PM Jeni Silverman DPT [facility]   7/13/2022  6:00 PM Bryson Vallejo PTA [facility]

## 2022-07-07 ENCOUNTER — HOSPITAL ENCOUNTER (OUTPATIENT)
Dept: PHYSICAL THERAPY | Age: 62
Discharge: HOME | End: 2022-07-07
Payer: COMMERCIAL

## 2022-07-07 PROCEDURE — 97110 THERAPEUTIC EXERCISES: CPT

## 2022-07-07 PROCEDURE — 97530 THERAPEUTIC ACTIVITIES: CPT
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
## 2022-07-13 ENCOUNTER — HOSPITAL ENCOUNTER (OUTPATIENT)
Dept: PHYSICAL THERAPY | Age: 62
Discharge: HOME | End: 2022-07-13
Payer: COMMERCIAL

## 2022-07-13 PROCEDURE — 97110 THERAPEUTIC EXERCISES: CPT

## 2022-07-13 PROCEDURE — 97140 MANUAL THERAPY 1/> REGIONS: CPT

## 2022-07-13 PROCEDURE — 97112 NEUROMUSCULAR REEDUCATION: CPT

## 2022-07-13 NOTE — PROGRESS NOTES
PT DAILY TREATMENT NOTE     Patient Name: Gary Gosselin  Date:2022  : 1960  [x]  Patient  Verified  Payor: Argelia Rishabh / Plan: Algis Bamberger / Product Type: HMO /    In time: 600 pm  Out time: 655 pm  Total Treatment Time (min):55  Visit #: 1 of 4    Medicare/BCBS Only   Total Timed Codes (min): 45 1:1 Treatment Time: 45       Treatment Area: Neck pain [M54.2]    SUBJECTIVE  Pain Level (0-10 scale): 0/10   Any medication changes, allergies to medications, adverse drug reactions, diagnosis change, or new procedure performed?: [x] No    [] Yes (see summary sheet for update)  Subjective functional status/changes:   [] No changes reported  Patient reports that she was a little dizzy yesterday and a slight headache today. OBJECTIVE    Modality rationale: decrease inflammation, decrease pain and increase tissue extensibility to improve the patients ability to assist with performing ADL's and functional tasks without limitations.    Min Type Additional Details    [] Estim:  []Unatt       []IFC  []Premod                        []Other:  []w/ice   []w/heat  Position:  Location:    [] Estim: []Att    []TENS instruct  []NMES                    []Other:  []w/US   []w/ice   []w/heat  Position:  Location:    []  Traction: [] Cervical       []Lumbar                       [] Prone          []Supine                       []Intermittent   []Continuous Lbs:  [] before manual  [] after manual    []  Ultrasound: []Continuous   [] Pulsed                           []1MHz   []3MHz W/cm2:  Location:    []  Iontophoresis with dexamethasone         Location: [] Take home patch   [] In clinic   10 []  Ice     [x]  heat  []  Ice massage  []  Laser   []  Anodyne Position: reclined  Location:C/S    []  Laser with stim  []  Other:  Position:  Location:    []  Vasopneumatic Device    []  Right     []  Left  Pre-treatment girth:  Post-treatment girth:  Measured at (location):  Pressure:       [] lo [] med [] hi Temperature: [] lo [] med [] hi   [] Skin assessment post-treatment:  []intact []redness- no adverse reaction  []redness - adverse reaction:     20 min Therapeutic Exercise:  [x] See flow sheet :   Rationale: increase ROM and increase strength to improve the patients overall muscle endurance and activity tolerance for ADL's and functional tasks. min Therapeutic Activity:  [x]  See flow sheet :    Rationale: increase strength and improve coordination  to improve the patients ability to safely perform dynamic activities and to improve functional performance of ADL's. 15  min Neuromuscular Re-education:  [x]  See flow sheet :   Rationale: increase strength, improve coordination and improve balance  to improve the patients ability to perform activities with good form, stability and proprioception. 10 min Manual Therapy: STM/DTM to cervical musculature, manual stretching.  (in supine position)   The manual therapy interventions were performed at a separate and distinct time from the therapeutic activities interventions. Rationale: decrease pain, increase ROM, increase tissue extensibility, decrease trigger points and increase postural awareness to assist with performing ADL's with ease. With   [] TE   [] TA   [] neuro   [] other: Patient Education: [x] Review HEP    [] Progressed/Changed HEP based on:   [] positioning   [] body mechanics   [] transfers   [] heat/ice application    [] other:      Other Objective/Functional Measures:  Muscle spasms and TTP present at cervical paraspinals and (B) UT    Pain Level (0-10 scale) post treatment: 0/10     ASSESSMENT/Changes in Function:   Patient is progressing well towards goals. Patient performed exercises, as per flow sheet, to assist with increasing functional strength. Cervical muscle spasms decreased after manual therapy. vPatient responded well to today's session, as evident by, abolished headache and decreased cervical pain.  Patient will continue to benefit from skilled PT services to modify and progress therapeutic interventions, address functional mobility deficits, address ROM deficits, address strength deficits, analyze and address soft tissue restrictions, analyze and cue movement patterns, analyze and modify body mechanics/ergonomics, assess and modify postural abnormalities and instruct in home and community integration to attain remaining goals. []  See Plan of Care  []  See progress note/recertification  []  See Discharge Summary         Progress towards goals / Updated goals:  1. Patient will increase FOTO score to >/= 72 points to indicate increased functional mobility.   PN: Regressed: 63 points, 07/07/22    2.  Patient will demonstrate and report full confidence and ind with advanced HEP, in order to best manage condition/maintain CLOF following upcoming D/C from services  P/N: advanced HEP to be issued at upcoming session w/ continually assessing at that time, 07/07/22       PLAN  [x]  Upgrade activities as tolerated     [x]  Continue plan of care  []  Update interventions per flow sheet       []  Discharge due to:_  []  Other:_      Mandi Maharaj PTA 7/13/2022  3:04 PM    Future Appointments   Date Time Provider Ha Whitten   7/13/2022  6:00 PM Brianna Knight PTA [facility]   7/19/2022  3:00 PM Brianna Knight PTA [facility]   7/27/2022  6:00 PM Brianna Knight PTA [facility]

## 2022-07-19 ENCOUNTER — HOSPITAL ENCOUNTER (OUTPATIENT)
Dept: PHYSICAL THERAPY | Age: 62
Discharge: HOME | End: 2022-07-19
Payer: COMMERCIAL

## 2022-07-19 PROCEDURE — 97112 NEUROMUSCULAR REEDUCATION: CPT

## 2022-07-19 PROCEDURE — 97110 THERAPEUTIC EXERCISES: CPT

## 2022-07-19 PROCEDURE — 97140 MANUAL THERAPY 1/> REGIONS: CPT

## 2022-07-19 NOTE — PROGRESS NOTES
PT DAILY TREATMENT NOTE     Patient Name: Gayatri Agudelo  Date:2022  : 1960  [x]  Patient  Verified  Payor: Molly Galeas / Plan: Olegario Estrada / Product Type: HMO /    In time: 300 pm  Out time: 400 pm  Total Treatment Time (min): 60   Visit #: 2 of 4    Medicare/BCBS Only   Total Timed Codes (min): 50 1:1 Treatment Time: 50       Treatment Area: Neck pain [M54.2]    SUBJECTIVE  Pain Level (0-10 scale): 0-1/10   Any medication changes, allergies to medications, adverse drug reactions, diagnosis change, or new procedure performed?: [x] No    [] Yes (see summary sheet for update)  Subjective functional status/changes:   [] No changes reported  Patient reports a little bit of discomfort on the left side of her neck and head but reports that it's not enough to call it pain. OBJECTIVE    Modality rationale: decrease inflammation, decrease pain and increase tissue extensibility to improve the patients ability to assist with performing ADL's and functional tasks without limitations.    Min Type Additional Details    [] Estim:  []Unatt       []IFC  []Premod                        []Other:  []w/ice   []w/heat  Position:  Location:    [] Estim: []Att    []TENS instruct  []NMES                    []Other:  []w/US   []w/ice   []w/heat  Position:  Location:    []  Traction: [] Cervical       []Lumbar                       [] Prone          []Supine                       []Intermittent   []Continuous Lbs:  [] before manual  [] after manual    []  Ultrasound: []Continuous   [] Pulsed                           []1MHz   []3MHz W/cm2:  Location:    []  Iontophoresis with dexamethasone         Location: [] Take home patch   [] In clinic   10 []  Ice     [x]  heat  []  Ice massage  []  Laser   []  Anodyne Position: reclined  Location:C/S    []  Laser with stim  []  Other:  Position:  Location:    []  Vasopneumatic Device    []  Right     []  Left  Pre-treatment girth:  Post-treatment girth:  Measured at (location):  Pressure:       [] lo [] med [] hi   Temperature: [] lo [] med [] hi   [] Skin assessment post-treatment:  []intact []redness- no adverse reaction  []redness - adverse reaction:     20 min Therapeutic Exercise:  [x] See flow sheet :   Rationale: increase ROM and increase strength to improve the patients overall muscle endurance and activity tolerance for ADL's and functional tasks. min Therapeutic Activity:  [x]  See flow sheet :    Rationale: increase strength and improve coordination  to improve the patients ability to safely perform dynamic activities and to improve functional performance of ADL's. 15  min Neuromuscular Re-education:  [x]  See flow sheet :   Rationale: increase strength, improve coordination and improve balance  to improve the patients ability to perform activities with good form, stability and proprioception. 15 min Manual Therapy: STM/DTM to cervical musculature, manual stretching.  (in supine position)   The manual therapy interventions were performed at a separate and distinct time from the therapeutic activities interventions. Rationale: decrease pain, increase ROM, increase tissue extensibility, decrease trigger points and increase postural awareness to assist with performing ADL's with ease. With   [] TE   [] TA   [] neuro   [] other: Patient Education: [x] Review HEP    [] Progressed/Changed HEP based on:   [] positioning   [] body mechanics   [] transfers   [] heat/ice application    [] other:      Other Objective/Functional Measures:  Muscle spasms and TTP present at (B) UT and Levator scap     Progressed exercises to Nubieber machine, as per flow sheet    Pain Level (0-10 scale) post treatment: 0/10     ASSESSMENT/Changes in Function:   Patient is progressing well towards goals. Progressed exercises, as per flow sheet, to assist with increasing functional strength. Patient presented with increased left UT tenderness.  Patient was challenged with today's progressed exercises. Patient responded well to today's session, as evident by, decreased cervical muscle spasms. Patient will continue to benefit from skilled PT services to modify and progress therapeutic interventions, address functional mobility deficits, address ROM deficits, address strength deficits, analyze and address soft tissue restrictions, analyze and cue movement patterns, analyze and modify body mechanics/ergonomics, assess and modify postural abnormalities and instruct in home and community integration to attain remaining goals. []  See Plan of Care  []  See progress note/recertification  []  See Discharge Summary         Progress towards goals / Updated goals:  1. Patient will increase FOTO score to >/= 72 points to indicate increased functional mobility.   PN: Regressed: 63 points, 07/07/22    2.  Patient will demonstrate and report full confidence and ind with advanced HEP, in order to best manage condition/maintain CLOF following upcoming D/C from services  P/N: advanced HEP to be issued at upcoming session w/ continually assessing at that time, 07/07/22     PLAN  [x]  Upgrade activities as tolerated     [x]  Continue plan of care  []  Update interventions per flow sheet       []  Discharge due to:_  []  Other:_      Meghan Gonzalez PTA 7/19/2022  3:04 PM    Future Appointments   Date Time Provider Ha Whitten   7/27/2022  6:00 PM [facility]   8/1/2022  1:30 PM Gisell Nurse, PTA [id number]

## 2022-07-27 ENCOUNTER — HOSPITAL ENCOUNTER (OUTPATIENT)
Dept: PHYSICAL THERAPY | Age: 62
Discharge: HOME | End: 2022-07-27
Payer: COMMERCIAL

## 2022-07-27 PROCEDURE — 97112 NEUROMUSCULAR REEDUCATION: CPT

## 2022-07-27 PROCEDURE — 97140 MANUAL THERAPY 1/> REGIONS: CPT

## 2022-07-27 PROCEDURE — 97530 THERAPEUTIC ACTIVITIES: CPT

## 2022-07-27 PROCEDURE — 97110 THERAPEUTIC EXERCISES: CPT

## 2022-07-27 NOTE — PROGRESS NOTES
PT DAILY TREATMENT NOTE     Patient Name: Nga Singletary  Date:2022  : 1960  [x]  Patient  Verified  Payor: Manoj Vicente / Plan: Tequila Valdez / Product Type: HMO /    In time: 600 pm  Out time: 659 pm  Total Treatment Time (min): 61  Visit #: 3 of 4    Medicare/BCBS Only   Total Timed Codes (min): 49 1:1 Treatment Time: 49        Treatment Area: Neck pain [M54.2]    SUBJECTIVE  Pain Level (0-10 scale): 0-1/10   Any medication changes, allergies to medications, adverse drug reactions, diagnosis change, or new procedure performed?: [x] No    [] Yes (see summary sheet for update)  Subjective functional status/changes:   [] No changes reported  Patient reports having a a sharp pain on the side of her neck a couple of days ago. Patient states that she is feeling better today. OBJECTIVE    Modality rationale: decrease inflammation, decrease pain and increase tissue extensibility to improve the patients ability to assist with performing ADL's and functional tasks without limitations.    Min Type Additional Details    [] Estim:  []Unatt       []IFC  []Premod                        []Other:  []w/ice   []w/heat  Position:  Location:    [] Estim: []Att    []TENS instruct  []NMES                    []Other:  []w/US   []w/ice   []w/heat  Position:  Location:    []  Traction: [] Cervical       []Lumbar                       [] Prone          []Supine                       []Intermittent   []Continuous Lbs:  [] before manual  [] after manual    []  Ultrasound: []Continuous   [] Pulsed                           []1MHz   []3MHz W/cm2:  Location:    []  Iontophoresis with dexamethasone         Location: [] Take home patch   [] In clinic   10 []  Ice     [x]  heat  []  Ice massage  []  Laser   []  Anodyne Position: reclined  Location:C/S    []  Laser with stim  []  Other:  Position:  Location:    []  Vasopneumatic Device    []  Right     []  Left  Pre-treatment girth:  Post-treatment girth:  Measured at (location):  Pressure:       [] lo [] med [] hi   Temperature: [] lo [] med [] hi   [] Skin assessment post-treatment:  []intact []redness- no adverse reaction  []redness - adverse reaction:     20 min Therapeutic Exercise:  [x] See flow sheet :   Rationale: increase ROM and increase strength to improve the patients overall muscle endurance and activity tolerance for ADL's and functional tasks. 9 min Therapeutic Activity:  [x]  See flow sheet :    Rationale: increase strength and improve coordination  to improve the patients ability to safely perform dynamic activities and to improve functional performance of ADL's. 10 min Neuromuscular Re-education:  [x]  See flow sheet :   Rationale: increase strength, improve coordination and improve balance  to improve the patients ability to perform activities with good form, stability and proprioception. 10 min Manual Therapy: STM/DTM to cervical musculature, manual stretching.  (in supine position)   The manual therapy interventions were performed at a separate and distinct time from the therapeutic activities interventions. Rationale: decrease pain, increase ROM, increase tissue extensibility, decrease trigger points and increase postural awareness to assist with performing ADL's with ease. With   [] TE   [] TA   [] neuro   [] other: Patient Education: [x] Review HEP    [] Progressed/Changed HEP based on:   [] positioning   [] body mechanics   [] transfers   [] heat/ice application    [] other:      Other Objective/Functional Measures:  Muscle spasms and TTP present at (B) UT and Levator scap     Pain Level (0-10 scale) post treatment: 0/10     ASSESSMENT/Changes in Function:   Patient is progressing well towards goals. Patient performed exercises, as per flow sheet, to assist with increasing functional strength. Increased muscle fatigue was experienced with today's exercises. Muscle spasms decreased after manual therapy.  Patient responded well to today's session, as evident by, decreased cervical muscle spasms. Patient will continue to benefit from skilled PT services to modify and progress therapeutic interventions, address functional mobility deficits, address ROM deficits, address strength deficits, analyze and address soft tissue restrictions, analyze and cue movement patterns, analyze and modify body mechanics/ergonomics, assess and modify postural abnormalities and instruct in home and community integration to attain remaining goals. []  See Plan of Care  []  See progress note/recertification  []  See Discharge Summary         Progress towards goals / Updated goals:  1. Patient will increase FOTO score to >/= 72 points to indicate increased functional mobility. PN: Regressed: 63 points, 07/07/22    2.  Patient will demonstrate and report full confidence and ind with advanced HEP, in order to best manage condition/maintain CLOF following upcoming D/C from services  P/N: advanced HEP to be issued at upcoming session w/ continually assessing at that time, 07/07/22     PLAN  [x]  Upgrade activities as tolerated     [x]  Continue plan of care  []  Update interventions per flow sheet       []  Discharge due to:_  []  Other:_      Cedric Viramontes PTA 7/27/2022  3:04 PM    Future Appointments   Date Time Provider Ha Whitten   8/1/2022  1:30 PM Nkaul Bangura PTA [id number]

## 2022-08-01 ENCOUNTER — HOSPITAL ENCOUNTER (OUTPATIENT)
Dept: PHYSICAL THERAPY | Age: 62
Discharge: HOME | End: 2022-08-01
Payer: COMMERCIAL

## 2022-08-01 PROCEDURE — 97530 THERAPEUTIC ACTIVITIES: CPT

## 2022-08-01 PROCEDURE — 97140 MANUAL THERAPY 1/> REGIONS: CPT

## 2022-08-01 PROCEDURE — 97110 THERAPEUTIC EXERCISES: CPT
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
## 2022-09-08 ENCOUNTER — TRANSCRIBE ORDER (OUTPATIENT)
Dept: SCHEDULING | Age: 62
End: 2022-09-08

## 2022-09-08 DIAGNOSIS — Z13.820 OSTEOPOROSIS SCREENING: Primary | ICD-10-CM

## 2022-09-08 DIAGNOSIS — N95.1 MENOPAUSAL SYMPTOM: ICD-10-CM

## 2022-09-14 ENCOUNTER — HOSPITAL ENCOUNTER (OUTPATIENT)
Dept: BONE DENSITY | Age: 62
Discharge: HOME | End: 2022-09-14
Attending: FAMILY MEDICINE
Payer: COMMERCIAL

## 2022-09-14 DIAGNOSIS — N95.1 MENOPAUSAL SYMPTOM: ICD-10-CM

## 2022-09-14 DIAGNOSIS — Z13.820 OSTEOPOROSIS SCREENING: ICD-10-CM

## 2022-09-14 PROCEDURE — 77080 DXA BONE DENSITY AXIAL: CPT

## 2023-01-10 ENCOUNTER — TRANSCRIBE ORDER (OUTPATIENT)
Dept: SCHEDULING | Age: 63
End: 2023-01-10

## 2023-01-10 DIAGNOSIS — Z12.31 VISIT FOR SCREENING MAMMOGRAM: Primary | ICD-10-CM

## 2023-02-01 DIAGNOSIS — Z12.31 VISIT FOR SCREENING MAMMOGRAM: Primary | ICD-10-CM

## 2023-02-03 ENCOUNTER — HOSPITAL ENCOUNTER (OUTPATIENT)
Dept: MAMMOGRAPHY | Age: 63
Discharge: HOME | End: 2023-02-03
Attending: FAMILY MEDICINE
Payer: COMMERCIAL

## 2023-02-03 DIAGNOSIS — Z12.31 VISIT FOR SCREENING MAMMOGRAM: ICD-10-CM

## 2023-02-03 PROCEDURE — 77063 BREAST TOMOSYNTHESIS BI: CPT

## 2023-02-04 DIAGNOSIS — Z12.31 VISIT FOR SCREENING MAMMOGRAM: Primary | ICD-10-CM

## 2023-06-05 ENCOUNTER — HOSPITAL ENCOUNTER (OUTPATIENT)
Facility: HOSPITAL | Age: 63
Discharge: HOME | End: 2023-06-08
Payer: COMMERCIAL

## 2023-06-05 DIAGNOSIS — R10.2 PELVIC PAIN: ICD-10-CM

## 2023-06-05 PROCEDURE — 76830 TRANSVAGINAL US NON-OB: CPT

## 2024-02-14 ENCOUNTER — HOSPITAL ENCOUNTER (OUTPATIENT)
Facility: HOSPITAL | Age: 64
Discharge: HOME | End: 2024-02-17
Payer: COMMERCIAL

## 2024-02-14 VITALS — BODY MASS INDEX: 35.5 KG/M2 | WEIGHT: 188.05 LBS | HEIGHT: 61 IN

## 2024-02-14 DIAGNOSIS — Z12.31 VISIT FOR SCREENING MAMMOGRAM: ICD-10-CM

## 2024-02-14 PROCEDURE — 77063 BREAST TOMOSYNTHESIS BI: CPT

## 2025-01-27 ENCOUNTER — TRANSCRIBE ORDERS (OUTPATIENT)
Facility: HOSPITAL | Age: 65
End: 2025-01-27

## 2025-01-27 DIAGNOSIS — Z12.31 OTHER SCREENING MAMMOGRAM: Primary | ICD-10-CM

## 2025-02-27 ENCOUNTER — HOSPITAL ENCOUNTER (OUTPATIENT)
Facility: HOSPITAL | Age: 65
Discharge: HOME | End: 2025-02-27
Payer: COMMERCIAL

## 2025-02-27 VITALS — HEIGHT: 61 IN | WEIGHT: 175 LBS | BODY MASS INDEX: 33.04 KG/M2

## 2025-02-27 DIAGNOSIS — Z12.31 OTHER SCREENING MAMMOGRAM: ICD-10-CM

## 2025-02-27 PROCEDURE — 77063 BREAST TOMOSYNTHESIS BI: CPT

## (undated) DEVICE — Z CONVERTED USE 2274618 CRUTCH ALU ADLT 5.2IN-5.10IN PREM

## (undated) DEVICE — KNEE ARTHROSCOPY III-LF: Brand: MEDLINE INDUSTRIES, INC.

## (undated) DEVICE — 3M™ BAIR PAWS FLEX™ WARMING GOWN, STANDARD, 20 PER CASE 81003: Brand: BAIR PAWS™

## (undated) DEVICE — APPLICATOR BNDG 1MM ADH PREMIERPRO EXOFIN

## (undated) DEVICE — SUTURE MCRYL SZ 4-0 L18IN ABSRB UD L19MM PS-2 3/8 CIR PRIM Y496G

## (undated) DEVICE — [ARTHROSCOPY PUMP,  DO NOT USE IF PACKAGE IS DAMAGED,  KEEP DRY,  KEEP AWAY FROM SUNLIGHT,  PROTECT FROM HEAT AND RADIOACTIVE SOURCES.]: Brand: FLOSTEADY

## (undated) DEVICE — KENDALL SCD EXPRESS SLEEVES, KNEE LENGTH, MEDIUM: Brand: KENDALL SCD

## (undated) DEVICE — ARTHROSCOPIC KNEE HOLDER: Brand: DEVON

## (undated) DEVICE — INTENDED FOR TISSUE SEPARATION, AND OTHER PROCEDURES THAT REQUIRE A SHARP SURGICAL BLADE TO PUNCTURE OR CUT.: Brand: BARD-PARKER SAFETY BLADES SIZE 11, STERILE

## (undated) DEVICE — (D)PREP SKN CHLRAPRP APPL 26ML -- CONVERT TO ITEM 371833

## (undated) DEVICE — 4-PORT MANIFOLD: Brand: NEPTUNE 2

## (undated) DEVICE — SOLUTION IRRIG 3000ML 0.9% SOD CHL FLX CONT 0797208] ICU MEDICAL INC]